# Patient Record
Sex: MALE | Race: WHITE | Employment: OTHER | URBAN - METROPOLITAN AREA
[De-identification: names, ages, dates, MRNs, and addresses within clinical notes are randomized per-mention and may not be internally consistent; named-entity substitution may affect disease eponyms.]

---

## 2017-04-11 NOTE — OP NOTES
PROCEDURE NOTE    Patient: [facility]     DOS: 4/13/2017  Physician: Tyler Tracey MD     SSN:  [id number]      MRN: [id number]  Referring: Dia Lockhart MD    Procedure: Right lumbar medial branch nerve cooled radiofrequency lesion x4    Fluoroscopic guidance    Diagnoses: lumbar spondylosis, M47.816    Surgeon: Tyler Tracey MD    Sedation: IV fentanyl    Location: [address] [place] [address]    Indication: 66 y.o. man with a history of lumbar facet joint DJD and chronic back pain with marked tenderness over the facet joints and positive provocative maneuvers in the history and physical exam.  Prior joint injections and branch nerve blocks were of significant but the expected temporary relief. The patient will have right lumbar medial branch nerve RF lesions today to provide some longer-lasting relief. Description of Procedure: After informed consent was obtained, the patient had standard monitors applied. The patient was placed prone on the procedure table. The right lumbar facet joints were identified under fluoroscopy and the area overlying was prepped with 3 passes of iodine and sterilely draped. Beginning with the L5-S1 level, local anesthetic was injected into the skin and subcutaneous tissues. A 17 gauge, 75 mm RFL introducer needle was inserted and placed under fluoroscopic guidance onto the ala. A second needle was placed after local anesthetic injection on the base of the inferior articular process of the L 4-5 joint. Sensory stimulus at 50 Hz to one volt and motor stimulus at 2 Hz up to 2 volts were negative for sensation or motor activity int he lower extremity. Negative aspiration was followed by easy injection of 2 mL of 1 % plain lidocaine. After 60 seconds, a cooled RF lesion of 60 +/- 2 degrees Centigrade with a 5 mm active tip was applied for a two minute and 45 second cycle without discomfort.   The same procedure was used to perform lesions  At the L 3-4 and L 2-3 levels, all on the right. Negative sensory and motor stimulus was confirmed at each site followed by local anesthetic injection and the same RF intensity and duration with one interruption to inject additional local anesthetic, the remainder of the cycle proceeded without discomfort. A total of four lesions were made, all on the right. There was no significant bleeding from any puncture site. The patient appeared to tolerate the procedure well without complications. The patient was allowed to recover 30 minutes, after which, the patient was alert and oriented times 3 and able to dress and ambulate on their own. The patient was discharged in stable condition. Fluids: None  Estimated Blood Loss: None  Complications: None  Implants: None  Specimens: None  Findings: N/A    Follow-up: RTC in three weeks as previously scheduled. The patient was discharged with instructions to contact us in the interim with any questions or concerns.         Signed By:  Brian Marquez MD           Copy: Files Robby Bass [phone]

## 2017-04-13 ENCOUNTER — HOSPITAL ENCOUNTER (OUTPATIENT)
Age: 72
Setting detail: OUTPATIENT SURGERY
Discharge: HOME | End: 2017-04-13
Attending: PAIN MEDICINE | Admitting: PAIN MEDICINE
Payer: MEDICARE

## 2017-04-13 ENCOUNTER — SURGERY (OUTPATIENT)
Age: 72
End: 2017-04-13

## 2017-04-13 ENCOUNTER — HOSPITAL ENCOUNTER (OUTPATIENT)
Dept: GENERAL RADIOLOGY | Age: 72
Discharge: HOME | End: 2017-04-13
Attending: PAIN MEDICINE
Payer: MEDICARE

## 2017-04-13 VITALS
OXYGEN SATURATION: 93 % | WEIGHT: 189.25 LBS | RESPIRATION RATE: 16 BRPM | BODY MASS INDEX: 29.7 KG/M2 | DIASTOLIC BLOOD PRESSURE: 63 MMHG | SYSTOLIC BLOOD PRESSURE: 130 MMHG | TEMPERATURE: 97.6 F | HEIGHT: 67 IN | HEART RATE: 52 BPM

## 2017-04-13 DIAGNOSIS — R69 DIAGNOSIS UNKNOWN: ICD-10-CM

## 2017-04-13 PROCEDURE — 74011000250 HC RX REV CODE- 250: Performed by: PAIN MEDICINE

## 2017-04-13 PROCEDURE — 76010000138 HC OR TIME 0.5 TO 1 HR: Performed by: PAIN MEDICINE

## 2017-04-13 PROCEDURE — 77030014125 HC TY EPDRL BBMI -B: Performed by: PAIN MEDICINE

## 2017-04-13 PROCEDURE — 77030020508 HC PD GRND GENRTR BAYL -A: Performed by: PAIN MEDICINE

## 2017-04-13 PROCEDURE — 99152 MOD SED SAME PHYS/QHP 5/>YRS: CPT

## 2017-04-13 PROCEDURE — 74011250636 HC RX REV CODE- 250/636: Performed by: PAIN MEDICINE

## 2017-04-13 PROCEDURE — 77030030797 HC PRB ENDOSC SINRGY AVNM -G: Performed by: PAIN MEDICINE

## 2017-04-13 PROCEDURE — 76210000021 HC REC RM PH II 0.5 TO 1 HR: Performed by: PAIN MEDICINE

## 2017-04-13 PROCEDURE — 77030037530: Performed by: PAIN MEDICINE

## 2017-04-13 PROCEDURE — 99153 MOD SED SAME PHYS/QHP EA: CPT

## 2017-04-13 PROCEDURE — 74011250636 HC RX REV CODE- 250/636

## 2017-04-13 RX ORDER — LIDOCAINE HYDROCHLORIDE 10 MG/ML
INJECTION INFILTRATION; PERINEURAL AS NEEDED
Status: DISCONTINUED | OUTPATIENT
Start: 2017-04-13 | End: 2017-04-13 | Stop reason: HOSPADM

## 2017-04-13 RX ORDER — FENTANYL CITRATE 50 UG/ML
INJECTION, SOLUTION INTRAMUSCULAR; INTRAVENOUS AS NEEDED
Status: DISCONTINUED | OUTPATIENT
Start: 2017-04-13 | End: 2017-04-13 | Stop reason: HOSPADM

## 2017-04-13 RX ADMIN — LIDOCAINE HYDROCHLORIDE 11 ML: 10 INJECTION, SOLUTION INFILTRATION; PERINEURAL at 09:30

## 2017-04-13 RX ADMIN — FENTANYL CITRATE 50 MCG: 50 INJECTION, SOLUTION INTRAMUSCULAR; INTRAVENOUS at 09:04

## 2017-04-13 NOTE — IP AVS SNAPSHOT
Earl Mccauley 
 
 
 [address] 
[phone] Patient: Fernando Avendaño MRN: [id number] VMK:0/88/2112 You are allergic to the following Allergen Reactions Motrin (Ibuprofen) Rash Pcn (Penicillins) Swelling Recent Documentation Height Weight BMI Smoking Status 1.702 m 85.8 kg 29.64 kg/m2 Current Every Day Smoker Emergency Contacts Name Discharge Info Relation Home Work Mobile Enedelia Guevara [5] [phone] About your hospitalization You were admitted on:  April 13, 2017 You last received care in the:  [facility] PACU You were discharged on:  April 13, 2017 Unit phone number:  [phone] Why you were hospitalized Your primary diagnosis was:  Not on File Providers Seen During Your Hospitalizations Provider Role Specialty Primary office phone Brandi Sadler MD Attending Provider Anesthesiology [phone] Your Primary Care Physician (PCP) Primary Care Physician Office Phone Office Fax [facility] [phone] Follow-up Information Follow up With Details Comments Contact Info Lilia Pollack, [address] [place] [id number] 
[phone] Your Appointments Friday April 28, 2017 11:10 AM EDT Office Visit Over 3 years with Javy Garay DO [facility] ([facility]) [address] [place] [address] Dasha Nichols  
[phone] Current Discharge Medication List  
  
CONTINUE these medications which have CHANGED Dose & Instructions Dispensing Information Comments Morning Noon Evening Bedtime  
 insulin NPH/insulin regular 100 unit/mL (70-30) injection Commonly known as:  NOVOLIN 70/30, HUMULIN 70/30 What changed:  additional instructions Your last dose was: Your next dose is: by SubCUTAneous route. Resume home dose Quantity:  10 mL Refills:  0 CONTINUE these medications which have NOT CHANGED Dose & Instructions Dispensing Information Comments Morning Noon Evening Bedtime  
 amLODIPine 5 mg tablet Commonly known as:  Deanna Chao Your last dose was: Your next dose is:    
   
   
 Dose:  5 mg Take 5 mg by mouth nightly. Refills:  0  
     
   
   
   
  
 citalopram 40 mg tablet Commonly known as:  Kapil Chavez Your last dose was: Your next dose is:    
   
   
 Dose:  40 mg Take 40 mg by mouth every morning. Indications: ptsd Refills:  0  
     
   
   
   
  
 COMBIVENT RESPIMAT  mcg/actuation inhaler Generic drug:  ipratropium-albuterol Your last dose was: Your next dose is:    
   
   
 Dose:  1 Puff Take 1 Puff by inhalation every six (6) hours as needed for Wheezing. Refills:  0  
     
   
   
   
  
 diazePAM 10 mg tablet Commonly known as:  VALIUM Your last dose was: Your next dose is:    
   
   
 Dose:  10 mg Take 10 mg by mouth every six (6) hours as needed for Anxiety. Indications: not taking Refills:  0  
     
   
   
   
  
 furosemide 40 mg tablet Commonly known as:  LASIX Your last dose was: Your next dose is: Take  by mouth daily as needed. Refills:  0 LIQUITEARS OP Your last dose was: Your next dose is:    
   
   
 apply  to eye as needed. BOTH EYES Refills:  0  
     
   
   
   
  
 lisinopril 10 mg tablet Commonly known as:  Sonali Barrier Your last dose was: Your next dose is:    
   
   
 Dose:  10 mg  
take 10 mg by mouth every morning. Refills:  0  
     
   
   
   
  
 metoprolol tartrate 100 mg IR tablet Commonly known as:  LOPRESSOR Your last dose was:     
   
Your next dose is:    
   
   
 Dose:  100 mg  
 Take 100 mg by mouth two (2) times a day. Refills:  0  
     
   
   
   
  
 oxyCODONE-acetaminophen 7.5-325 mg per tablet Commonly known as:  PERCOCET 7.5 Your last dose was: Your next dose is:    
   
   
 Dose:  1 Tab Take 1 Tab by mouth two (2) times a day. Refills:  0 PLAVIX 75 mg Tab Generic drug:  clopidogrel Your last dose was: Your next dose is:    
   
   
 Dose:  75 mg Take 75 mg by mouth nightly. Refills:  0  
     
   
   
   
  
 pravastatin 40 mg tablet Commonly known as:  PRAVACHOL Your last dose was: Your next dose is:    
   
   
 Dose:  40 mg Take 40 mg by mouth nightly. Refills:  0 PROTONIX 20 mg tablet Generic drug:  pantoprazole Your last dose was: Your next dose is:    
   
   
 Dose:  20 mg Take 20 mg by mouth every morning. Refills:  0  
     
   
   
   
  
 topiramate 100 mg tablet Commonly known as:  TOPAMAX Your last dose was: Your next dose is:    
   
   
 Dose:  100 mg  
take 100 mg by mouth two (2) times a day. Refills:  0  
     
   
   
   
  
 VANICREAM topical cream  
Generic drug:  emollient Your last dose was: Your next dose is:    
   
   
 apply  to affected area as needed. Refills:  0 Discharge Instructions Pain Management Aftercare Common Side Effects that may last 8-12 hours: 
Drowsiness  Slurred speech  Dizziness Poor balance  Blurred vision Hangover effect (headache, upset stomach, etc.) Aftercare Instructions: You must have a responsible adult drive you home. Do not drive a car or operate equipment for at least 12 hours. Do not take any new medications for at least 24 hours unless your doctor has prescribed them and he is aware that you are taking them. Do not drink any alcoholic beverages until the next day. Advance to your normal diet as tolerated. Expect soreness at the injection site that will improve over the next 24 hours. Avoid strenuous exercise or heavy lifting. Pre-injection activities may be resumed tomorrow (unless instructed otherwise by your doctor). MAY USE ICE PACK FOR COMFORT Notify your doctor immediately if any of the following symptoms occur: 
Severe pain at the injection site Bleeding or drainage from injection site Fever 101 degrees F or greater New or increased weakness/numbness of extremities that does not resolve Severe headache that disappears or gets better when you lie down Breathing difficulty Skin rash Vomiting Seizures Unusual drowsiness Doctor's Phone Number: [phone] Follow-up Care: Thursday may 4th at 1330 in office DISCHARGE SUMMARY from Nurse PATIENT INSTRUCTIONS: 
 
After general anesthesia or intravenous sedation, for 24 hours or while taking prescription Narcotics: · Limit your activities · Do not drive and operate hazardous machinery · Do not make important personal or business decisions · Do  not drink alcoholic beverages · If you have not urinated within 8 hours after discharge, please contact your surgeon on call. *  Please give a list of your current medications to your Primary Care Provider. *  Please update this list whenever your medications are discontinued, doses are 
    changed, or new medications (including over-the-counter products) are added. *  Please carry medication information at all times in case of emergency situations. These are general instructions for a healthy lifestyle: No smoking/ No tobacco products/ Avoid exposure to second hand smoke Surgeon General's Warning:  Quitting smoking now greatly reduces serious risk to your health. Obesity, smoking, and sedentary lifestyle greatly increases your risk for illness A healthy diet, regular physical exercise & weight monitoring are important for maintaining a healthy lifestyle You may be retaining fluid if you have a history of heart failure or if you experience any of the following symptoms:  Weight gain of 3 pounds or more overnight or 5 pounds in a week, increased swelling in our hands or feet or shortness of breath while lying flat in bed. Please call your doctor as soon as you notice any of these symptoms; do not wait until your next office visit. Recognize signs and symptoms of STROKE: 
 
F-face looks uneven A-arms unable to move or move unevenly S-speech slurred or non-existent T-time-call 911 as soon as signs and symptoms begin-DO NOT go Back to bed or wait to see if you get better-TIME IS BRAIN. Discharge Orders None Introducing [facility]! Sarah Islas introduces Tyba patient portal. Now you can access parts of your medical record, email your doctor's office, and request medication refills online. 1. In your internet browser, go to [email or web address] 2. Click on the First Time User? Click Here link in the Sign In box. You will see the New Member Sign Up page. 3. Enter your Tyba Access Code exactly as it appears below. You will not need to use this code after youve completed the sign-up process. If you do not sign up before the expiration date, you must request a new code. · Tyba Access Code: [email or web address] Expires: 6/5/2017  3:55 PM 
 
4. Enter the last four digits of your Social Security Number (xxxx) and Date of Birth (mm/dd/yyyy) as indicated and click Submit. You will be taken to the next sign-up page. 5. Create a [employer] ID. This will be your Tyba login ID and cannot be changed, so think of one that is secure and easy to remember. 6. Create a Tyba password. You can change your password at any time. 7. Enter your Password Reset Question and Answer. This can be used at a later time if you forget your password. 8. Enter your e-mail address. You will receive e-mail notification when new information is available in [address] 9. Click Sign Up. You can now view and download portions of your medical record. 10. Click the Download Summary menu link to download a portable copy of your medical information. If you have questions, please visit the Frequently Asked Questions section of the bttn website. Remember, bttn is NOT to be used for urgent needs. For medical emergencies, dial 911. Now available from your iPhone and Android! General Information Please provide this summary of care documentation to your next provider. Patient Signature:  ____________________________________________________________ Date:  ____________________________________________________________  
  
[facility] Provider Signature:  ____________________________________________________________ Date:  ____________________________________________________________

## 2017-04-13 NOTE — H&P
Blood pressure 153/72, pulse 64, temperature 97.8 °F (36.6 °C), resp. rate 18, height 5' 7\" (1.702 m), weight 85.8 kg (189 lb 4 oz), SpO2 95 %. Date of Surgery Update:  Emma Sharma was seen and examined. History and physical has been reviewed. The patient has been examined.  There have been no significant clinical changes since the completion of the originally dated History and Physical.    Signed By: Jose Stuart MD     April 13, 2017 8:48 AM

## 2017-04-13 NOTE — DISCHARGE INSTRUCTIONS
Pain Management Aftercare    Common Side Effects that may last 8-12 hours:  Drowsiness  Slurred speech  Dizziness  Poor balance  Blurred vision     Hangover effect (headache, upset stomach, etc.)    Aftercare Instructions: You must have a responsible adult drive you home. Do not drive a car or operate equipment for at least 12 hours. Do not take any new medications for at least 24 hours unless your doctor has prescribed them and he is aware that you are taking them. Do not drink any alcoholic beverages until the next day. Advance to your normal diet as tolerated. Expect soreness at the injection site that will improve over the next 24 hours. Avoid strenuous exercise or heavy lifting. Pre-injection activities may be resumed tomorrow (unless instructed otherwise by your doctor). MAY USE ICE PACK FOR COMFORT   Notify your doctor immediately if any of the following symptoms occur:  Severe pain at the injection site  Bleeding or drainage from injection site  Fever 101 degrees F or greater  New or increased weakness/numbness of extremities that does not resolve  Severe headache that disappears or gets better when you lie down  Breathing difficulty  Skin rash  Vomiting  Seizures  Unusual drowsiness    Doctor's Phone Number: [phone]    Follow-up Care: Thursday may 4th at 1330 in office      DISCHARGE SUMMARY from Nurse    PATIENT INSTRUCTIONS:    After general anesthesia or intravenous sedation, for 24 hours or while taking prescription Narcotics:  · Limit your activities  · Do not drive and operate hazardous machinery  · Do not make important personal or business decisions  · Do  not drink alcoholic beverages  · If you have not urinated within 8 hours after discharge, please contact your surgeon on call. *  Please give a list of your current medications to your Primary Care Provider.     *  Please update this list whenever your medications are discontinued, doses are      changed, or new medications (including over-the-counter products) are added. *  Please carry medication information at all times in case of emergency situations. These are general instructions for a healthy lifestyle:    No smoking/ No tobacco products/ Avoid exposure to second hand smoke    Surgeon General's Warning:  Quitting smoking now greatly reduces serious risk to your health. Obesity, smoking, and sedentary lifestyle greatly increases your risk for illness    A healthy diet, regular physical exercise & weight monitoring are important for maintaining a healthy lifestyle    You may be retaining fluid if you have a history of heart failure or if you experience any of the following symptoms:  Weight gain of 3 pounds or more overnight or 5 pounds in a week, increased swelling in our hands or feet or shortness of breath while lying flat in bed. Please call your doctor as soon as you notice any of these symptoms; do not wait until your next office visit. Recognize signs and symptoms of STROKE:    F-face looks uneven    A-arms unable to move or move unevenly    S-speech slurred or non-existent    T-time-call 911 as soon as signs and symptoms begin-DO NOT go       Back to bed or wait to see if you get better-TIME IS BRAIN.

## 2018-04-30 ENCOUNTER — HOSPITAL ENCOUNTER (EMERGENCY)
Age: 73
Discharge: HOME | End: 2018-04-30
Attending: EMERGENCY MEDICINE
Payer: MEDICARE

## 2018-04-30 ENCOUNTER — APPOINTMENT (OUTPATIENT)
Dept: GENERAL RADIOLOGY | Age: 73
End: 2018-04-30
Attending: EMERGENCY MEDICINE
Payer: MEDICARE

## 2018-04-30 VITALS
TEMPERATURE: 98 F | HEIGHT: 65 IN | DIASTOLIC BLOOD PRESSURE: 72 MMHG | SYSTOLIC BLOOD PRESSURE: 152 MMHG | BODY MASS INDEX: 30.99 KG/M2 | WEIGHT: 186 LBS | OXYGEN SATURATION: 97 % | HEART RATE: 72 BPM | RESPIRATION RATE: 16 BRPM

## 2018-04-30 DIAGNOSIS — J44.1 ACUTE EXACERBATION OF CHRONIC OBSTRUCTIVE PULMONARY DISEASE (COPD) (HCC): Primary | ICD-10-CM

## 2018-04-30 LAB
ALBUMIN SERPL-MCNC: 3.6 G/DL (ref 3.2–4.6)
ALBUMIN/GLOB SERPL: 1 {RATIO} (ref 1.2–3.5)
ALP SERPL-CCNC: 88 U/L (ref 50–136)
ALT SERPL-CCNC: 21 U/L (ref 12–65)
ANION GAP SERPL CALC-SCNC: 6 MMOL/L (ref 7–16)
AST SERPL-CCNC: 16 U/L (ref 15–37)
ATRIAL RATE: 74 BPM
BASOPHILS # BLD: 0 K/UL (ref 0–0.2)
BASOPHILS NFR BLD: 0 % (ref 0–2)
BILIRUB SERPL-MCNC: 0.3 MG/DL (ref 0.2–1.1)
BNP SERPL-MCNC: 165 PG/ML
BUN SERPL-MCNC: 22 MG/DL (ref 8–23)
CALCIUM SERPL-MCNC: 9.2 MG/DL (ref 8.3–10.4)
CALCULATED P AXIS, ECG09: 46 DEGREES
CALCULATED R AXIS, ECG10: 34 DEGREES
CALCULATED T AXIS, ECG11: 18 DEGREES
CHLORIDE SERPL-SCNC: 105 MMOL/L (ref 98–107)
CO2 SERPL-SCNC: 26 MMOL/L (ref 21–32)
CREAT SERPL-MCNC: 1.28 MG/DL (ref 0.8–1.5)
DIAGNOSIS, 93000: NORMAL
DIFFERENTIAL METHOD BLD: ABNORMAL
EOSINOPHIL # BLD: 0.4 K/UL (ref 0–0.8)
EOSINOPHIL NFR BLD: 5 % (ref 0.5–7.8)
ERYTHROCYTE [DISTWIDTH] IN BLOOD BY AUTOMATED COUNT: 13.6 % (ref 11.9–14.6)
GLOBULIN SER CALC-MCNC: 3.5 G/DL (ref 2.3–3.5)
GLUCOSE SERPL-MCNC: 333 MG/DL (ref 65–100)
HCT VFR BLD AUTO: 40 % (ref 41.1–50.3)
HGB BLD-MCNC: 12.9 G/DL (ref 13.6–17.2)
IMM GRANULOCYTES # BLD: 0 K/UL (ref 0–0.5)
IMM GRANULOCYTES NFR BLD AUTO: 0 % (ref 0–5)
INR PPP: 1
LYMPHOCYTES # BLD: 1.5 K/UL (ref 0.5–4.6)
LYMPHOCYTES NFR BLD: 16 % (ref 13–44)
MCH RBC QN AUTO: 29.9 PG (ref 26.1–32.9)
MCHC RBC AUTO-ENTMCNC: 32.3 G/DL (ref 31.4–35)
MCV RBC AUTO: 92.6 FL (ref 79.6–97.8)
MONOCYTES # BLD: 0.6 K/UL (ref 0.1–1.3)
MONOCYTES NFR BLD: 7 % (ref 4–12)
NEUTS SEG # BLD: 6.7 K/UL (ref 1.7–8.2)
NEUTS SEG NFR BLD: 72 % (ref 43–78)
P-R INTERVAL, ECG05: 172 MS
PLATELET # BLD AUTO: 137 K/UL (ref 150–450)
PMV BLD AUTO: 12.4 FL (ref 10.8–14.1)
POTASSIUM SERPL-SCNC: 4.3 MMOL/L (ref 3.5–5.1)
PROT SERPL-MCNC: 7.1 G/DL (ref 6.3–8.2)
PROTHROMBIN TIME: 12.3 SEC (ref 11.5–14.5)
Q-T INTERVAL, ECG07: 404 MS
QRS DURATION, ECG06: 100 MS
QTC CALCULATION (BEZET), ECG08: 448 MS
RBC # BLD AUTO: 4.32 M/UL (ref 4.23–5.67)
SODIUM SERPL-SCNC: 137 MMOL/L (ref 136–145)
TROPONIN I BLD-MCNC: 0 NG/ML (ref 0.02–0.05)
VENTRICULAR RATE, ECG03: 74 BPM
WBC # BLD AUTO: 9.3 K/UL (ref 4.3–11.1)

## 2018-04-30 PROCEDURE — 85610 PROTHROMBIN TIME: CPT | Performed by: EMERGENCY MEDICINE

## 2018-04-30 PROCEDURE — 99285 EMERGENCY DEPT VISIT HI MDM: CPT | Performed by: EMERGENCY MEDICINE

## 2018-04-30 PROCEDURE — 74011250636 HC RX REV CODE- 250/636: Performed by: EMERGENCY MEDICINE

## 2018-04-30 PROCEDURE — 84484 ASSAY OF TROPONIN QUANT: CPT

## 2018-04-30 PROCEDURE — 96374 THER/PROPH/DIAG INJ IV PUSH: CPT | Performed by: EMERGENCY MEDICINE

## 2018-04-30 PROCEDURE — 85025 COMPLETE CBC W/AUTO DIFF WBC: CPT | Performed by: EMERGENCY MEDICINE

## 2018-04-30 PROCEDURE — 71045 X-RAY EXAM CHEST 1 VIEW: CPT

## 2018-04-30 PROCEDURE — 93005 ELECTROCARDIOGRAM TRACING: CPT | Performed by: EMERGENCY MEDICINE

## 2018-04-30 PROCEDURE — 74011000250 HC RX REV CODE- 250: Performed by: EMERGENCY MEDICINE

## 2018-04-30 PROCEDURE — 83880 ASSAY OF NATRIURETIC PEPTIDE: CPT | Performed by: EMERGENCY MEDICINE

## 2018-04-30 PROCEDURE — 94640 AIRWAY INHALATION TREATMENT: CPT

## 2018-04-30 PROCEDURE — 80053 COMPREHEN METABOLIC PANEL: CPT | Performed by: EMERGENCY MEDICINE

## 2018-04-30 RX ORDER — PREDNISONE 20 MG/1
20 TABLET ORAL 2 TIMES DAILY
Qty: 10 TAB | Refills: 0 | Status: SHIPPED | OUTPATIENT
Start: 2018-04-30 | End: 2018-05-05

## 2018-04-30 RX ORDER — IPRATROPIUM BROMIDE AND ALBUTEROL SULFATE 2.5; .5 MG/3ML; MG/3ML
3 SOLUTION RESPIRATORY (INHALATION)
Status: COMPLETED | OUTPATIENT
Start: 2018-04-30 | End: 2018-04-30

## 2018-04-30 RX ADMIN — METHYLPREDNISOLONE SODIUM SUCCINATE 125 MG: 125 INJECTION, POWDER, FOR SOLUTION INTRAMUSCULAR; INTRAVENOUS at 13:59

## 2018-04-30 RX ADMIN — IPRATROPIUM BROMIDE AND ALBUTEROL SULFATE 3 ML: .5; 3 SOLUTION RESPIRATORY (INHALATION) at 13:50

## 2018-04-30 NOTE — DISCHARGE INSTRUCTIONS
Chronic Obstructive Pulmonary Disease (COPD): Care Instructions  Your Care Instructions    Chronic obstructive pulmonary disease (COPD) is a general term for a group of lung diseases, including emphysema and chronic bronchitis. People with COPD have decreased airflow in and out of the lungs, which makes it hard to breathe. The airways also can get clogged with thick mucus. Cigarette smoking is a major cause of COPD. Although there is no cure for COPD, you can slow its progress. Following your treatment plan and taking care of yourself can help you feel better and live longer. Follow-up care is a key part of your treatment and safety. Be sure to make and go to all appointments, and call your doctor if you are having problems. It's also a good idea to know your test results and keep a list of the medicines you take. How can you care for yourself at home? ?Staying healthy  ? · Do not smoke. This is the most important step you can take to prevent more damage to your lungs. If you need help quitting, talk to your doctor about stop-smoking programs and medicines. These can increase your chances of quitting for good. ? · Avoid colds and flu. Get a pneumococcal vaccine shot. If you have had one before, ask your doctor whether you need a second dose. Get the flu vaccine every fall. If you must be around people with colds or the flu, wash your hands often. ? · Avoid secondhand smoke, air pollution, and high altitudes. Also avoid cold, dry air and hot, humid air. Stay at home with your windows closed when air pollution is bad. ?Medicines and oxygen therapy  ? · Take your medicines exactly as prescribed. Call your doctor if you think you are having a problem with your medicine. ? · You may be taking medicines such as:  ¨ Bronchodilators. These help open your airways and make breathing easier. Bronchodilators are either short-acting (work for 6 to 9 hours) or long-acting (work for 24 hours).  You inhale most bronchodilators, so they start to act quickly. Always carry your quick-relief inhaler with you in case you need it while you are away from home. ¨ Corticosteroids (prednisone, budesonide). These reduce airway inflammation. They come in pill or inhaled form. You must take these medicines every day for them to work well. ? · A spacer may help you get more inhaled medicine to your lungs. Ask your doctor or pharmacist if a spacer is right for you. If it is, ask how to use it properly. ? · Do not take any vitamins, over-the-counter medicine, or herbal products without talking to your doctor first.   ? · If your doctor prescribed antibiotics, take them as directed. Do not stop taking them just because you feel better. You need to take the full course of antibiotics. ? · Oxygen therapy boosts the amount of oxygen in your blood and helps you breathe easier. Use the flow rate your doctor has recommended, and do not change it without talking to your doctor first.   Activity  ? · Get regular exercise. Walking is an easy way to get exercise. Start out slowly, and walk a little more each day. ? · Pay attention to your breathing. You are exercising too hard if you cannot talk while you are exercising. ? · Take short rest breaks when doing household chores and other activities. ? · Learn breathing methods-such as breathing through pursed lips-to help you become less short of breath. ? · If your doctor has not set you up with a pulmonary rehabilitation program, talk to him or her about whether rehab is right for you. Rehab includes exercise programs, education about your disease and how to manage it, help with diet and other changes, and emotional support. Diet  ? · Eat regular, healthy meals. Use bronchodilators about 1 hour before you eat to make it easier to eat. Eat several small meals instead of three large ones. Drink beverages at the end of the meal. Avoid foods that are hard to chew.    ? · Eat foods that contain protein so that you do not lose muscle mass. ? · Talk with your doctor if you gain too much weight or if you lose weight without trying. ?Mental health  ? · Talk to your family, friends, or a therapist about your feelings. It is normal to feel frightened, angry, hopeless, helpless, and even guilty. Talking openly about bad feelings can help you cope. If these feelings last, talk to your doctor. When should you call for help? Call 911 anytime you think you may need emergency care. For example, call if:  ? · You have severe trouble breathing. ?Call your doctor now or seek immediate medical care if:  ? · You have new or worse trouble breathing. ? · You cough up blood. ? · You have a fever. ? Watch closely for changes in your health, and be sure to contact your doctor if:  ? · You cough more deeply or more often, especially if you notice more mucus or a change in the color of your mucus. ? · You have new or worse swelling in your legs or belly. ? · You are not getting better as expected. Where can you learn more? Go to http://nirmal-vicki.info/. Lyla Vega in the search box to learn more about \"Chronic Obstructive Pulmonary Disease (COPD): Care Instructions. \"  Current as of: May 12, 2017  Content Version: 11.4  © 9594-6677 [employer]. Care instructions adapted under license by [employer] (which disclaims liability or warranty for this information). If you have questions about a medical condition or this instruction, always ask your healthcare professional. [address] any warranty or liability for your use of this information.

## 2018-04-30 NOTE — ED NOTES
I have reviewed discharge instructions with the patient. The patient verbalized understanding. Patient left ED via Discharge Method: ambulatory to Home with self  Opportunity for questions and clarification provided. Patient given 1 scripts. To continue your aftercare when you leave the hospital, you may receive an automated call from our care team to check in on how you are doing. This is a free service and part of our promise to provide the best care and service to meet your aftercare needs.  If you have questions, or wish to unsubscribe from this service please call [phone]. Thank you for Choosing our [facility] Emergency Department.

## 2018-04-30 NOTE — ED PROVIDER NOTES
HPI Comments: Patient complains of SOB x 3 to 4 days. Cough productive of clear phlegm. Sinuses draining. Post nasal drip. Nasal congestion. No fever. No chest pain. Feet swollen. States he was supposed to get vascular studies today but was concerned about his breathing so decided to come here. Dr. Joselito Seaman is his cardiologist and recently ordered testing on him. He continues to smoke. Has had CABG and multiple stents. Using combivent inhaler. Patient is a 74 y.o. male presenting with shortness of breath. The history is provided by the patient. Shortness of Breath   This is a recurrent problem. The current episode started more than 2 days ago. The problem has not changed since onset. Associated symptoms include rhinorrhea, cough, sputum production, wheezing and leg swelling. Pertinent negatives include no fever, no headaches, no sore throat, no hemoptysis, no PND, no orthopnea and no chest pain. The problem's precipitants include smoke. He has tried ipratropium inhalers and beta-agonist inhalers for the symptoms. Associated medical issues include COPD and CAD. Past Medical History:   Diagnosis Date    Agent orange exposure     Arthritis     Aspirin long-term use     CAD (coronary artery disease)     cabg, mi x 3 and multiple heart stents    Chronic obstructive pulmonary disease (HCC)     Diabetes ([facility] [address] 1995     insulin reliant/ bs: .hypo in the 40's.      History of prostate cancer 2011    agent Orange    [place] (hard of hearing)     Hypertension     managed with meds    MI, old     x 3    Platelet inhibition due to Plavix     Psychiatric disorder     ptsd    PTSD (post-traumatic stress disorder)     [facility]    S/P CABG x 4 1996    Skin cancer     basal    Smoker     58 yrs 1 pk per day    Stented coronary artery     multiple    syncope        Past Surgical History:   Procedure Laterality Date    CABG, ARTERY-VEIN, FOUR      1996    ELBOW ARTHROSCOP,DIAGNOSTIC Left          HX APPENDECTOMY  1961    HX ORTHOPAEDIC Right     open right shoulder    HX PROSTATECTOMY      HX PTCA      stents heart    HX SHOULDER ARTHROSCOPY Right     HX UROLOGICAL      cysto         Family History:   Problem Relation Age of Onset    Heart Disease Mother     Stroke Mother     Heart Attack Mother     Heart Attack Father     Heart Disease Sister      MI, CABG pt does not remember age   [facility] Heart Attack Sister     Heart Disease Brother     Heart Attack Brother     Diabetes Brother     Heart Disease Brother        Social History     Social History    Marital status: LEGALLY      Spouse name: N/A    Number of children: N/A    Years of education: N/A     Occupational History    Not on file. Social History Main Topics    Smoking status: Current Every Day Smoker     Packs/day: 1.00     Years: 58.00    Smokeless tobacco: Never Used    Alcohol use No    Drug use: No    Sexual activity: Not on file     Other Topics Concern    Not on file     Social History Narrative         ALLERGIES: Motrin [ibuprofen] and Pcn [penicillins]    Review of Systems   Constitutional: Negative for appetite change, chills and fever. HENT: Positive for congestion, postnasal drip, rhinorrhea and sinus pressure. Negative for sore throat. Eyes: Negative. Respiratory: Positive for cough, sputum production, shortness of breath and wheezing. Negative for hemoptysis. Cardiovascular: Positive for leg swelling. Negative for chest pain, palpitations, orthopnea and PND. Gastrointestinal: Negative. Genitourinary: Negative. Musculoskeletal: Negative for arthralgias and myalgias. Skin: Negative. Neurological: Negative. Negative for headaches.        Vitals:    04/30/18 1421 04/30/18 1441 04/30/18 1500 04/30/18 1502   BP: 161/74 170/75  152/72   Pulse: 61 61  72   Resp: 20 20  16   Temp:       SpO2: 92% 93% 96% 97%   Weight:       Height:                Physical Exam   Constitutional: He is oriented to person, place, and time. He appears well-developed and well-nourished. HENT:   Head: Normocephalic and atraumatic. Right Ear: External ear normal.   Left Ear: External ear normal.   Mouth/Throat: Oropharynx is clear and moist.   Eyes: Conjunctivae and EOM are normal. Pupils are equal, round, and reactive to light. No scleral icterus. Neck: Normal range of motion. Neck supple. No JVD present. Cardiovascular: Normal rate, regular rhythm, normal heart sounds and intact distal pulses. Pulmonary/Chest: Effort normal. He has wheezes. Abdominal: Soft. Bowel sounds are normal. He exhibits no mass. There is no tenderness. Musculoskeletal: Normal range of motion. He exhibits edema (lower extremities - mild). He exhibits no tenderness. Neurological: He is alert and oriented to person, place, and time. Skin: Skin is warm and dry. No erythema. Psychiatric: He has a normal mood and affect. His behavior is normal.   Nursing note and vitals reviewed. MDM      ED Course       Procedures    SOB  Acute exacerbation COPD  EKG sinus with no ischemia  CXR clear no infiltrate  Labs reviewed  Duoneb nebulizer treatment  Solumedrol 125 mg IV  Improved  Results discussed with patient  Encouraged to stop smoking. He says he does not intend to and has had a good life.    Rx Prednisone 20 mg bid x 5 days  Follow up with Dr. Lorenza Le and PCP  Return with new or worsening symptoms

## 2018-04-30 NOTE — ED TRIAGE NOTES
Shortness of breath x 4-5 days. No wheezes in triage. Sign. Cardiac history. No chest pain on arrival to ED - pt sts none of his cardiac issues caused pain.

## 2018-08-28 ENCOUNTER — HOSPITAL ENCOUNTER (OUTPATIENT)
Dept: MRI IMAGING | Age: 73
Discharge: HOME | End: 2018-08-28
Attending: ORTHOPAEDIC SURGERY
Payer: MEDICARE

## 2018-08-28 DIAGNOSIS — M54.50 CHRONIC BILATERAL LOW BACK PAIN WITHOUT SCIATICA: ICD-10-CM

## 2018-08-28 DIAGNOSIS — G89.29 CHRONIC BILATERAL LOW BACK PAIN WITHOUT SCIATICA: ICD-10-CM

## 2018-08-28 PROCEDURE — 72148 MRI LUMBAR SPINE W/O DYE: CPT

## 2018-09-24 NOTE — IP AVS SNAPSHOT
Current Discharge Medication List  
  
CONTINUE these medications which have CHANGED Dose & Instructions Dispensing Information Comments Morning Noon Evening Bedtime  
 insulin NPH/insulin regular 100 unit/mL (70-30) injection Commonly known as:  NOVOLIN 70/30, HUMULIN 70/30 What changed:  additional instructions Your last dose was: Your next dose is:    
   
   
 by SubCUTAneous route. Resume home dose Quantity:  10 mL Refills:  0 CONTINUE these medications which have NOT CHANGED Dose & Instructions Dispensing Information Comments Morning Noon Evening Bedtime  
 amLODIPine 5 mg tablet Commonly known as:  Peterson Carmen Your last dose was: Your next dose is:    
   
   
 Dose:  5 mg Take 5 mg by mouth nightly. Refills:  0  
     
   
   
   
  
 citalopram 40 mg tablet Commonly known as:  Meagan Bazan Your last dose was: Your next dose is:    
   
   
 Dose:  40 mg Take 40 mg by mouth every morning. Indications: ptsd Refills:  0  
     
   
   
   
  
 COMBIVENT RESPIMAT  mcg/actuation inhaler Generic drug:  ipratropium-albuterol Your last dose was: Your next dose is:    
   
   
 Dose:  1 Puff Take 1 Puff by inhalation every six (6) hours as needed for Wheezing. Refills:  0  
     
   
   
   
  
 diazePAM 10 mg tablet Commonly known as:  VALIUM Your last dose was: Your next dose is:    
   
   
 Dose:  10 mg Take 10 mg by mouth every six (6) hours as needed for Anxiety. Indications: not taking Refills:  0  
     
   
   
   
  
 furosemide 40 mg tablet Commonly known as:  LASIX Your last dose was: Your next dose is: Take  by mouth daily as needed. Refills:  0 LIQUITEARS OP Your last dose was: Your next dose is:    
   
   
 apply  to eye as needed. BOTH EYES Refills:  0 lisinopril 10 mg tablet Commonly known as:  Zaynab Gear Your last dose was: Your next dose is:    
   
   
 Dose:  10 mg  
take 10 mg by mouth every morning. Refills:  0  
     
   
   
   
  
 metoprolol tartrate 100 mg IR tablet Commonly known as:  LOPRESSOR Your last dose was: Your next dose is:    
   
   
 Dose:  100 mg Take 100 mg by mouth two (2) times a day. Refills:  0  
     
   
   
   
  
 oxyCODONE-acetaminophen 7.5-325 mg per tablet Commonly known as:  PERCOCET 7.5 Your last dose was: Your next dose is:    
   
   
 Dose:  1 Tab Take 1 Tab by mouth two (2) times a day. Refills:  0 PLAVIX 75 mg Tab Generic drug:  clopidogrel Your last dose was: Your next dose is:    
   
   
 Dose:  75 mg Take 75 mg by mouth nightly. Refills:  0  
     
   
   
   
  
 pravastatin 40 mg tablet Commonly known as:  PRAVACHOL Your last dose was: Your next dose is:    
   
   
 Dose:  40 mg Take 40 mg by mouth nightly. Refills:  0 PROTONIX 20 mg tablet Generic drug:  pantoprazole Your last dose was: Your next dose is:    
   
   
 Dose:  20 mg Take 20 mg by mouth every morning. Refills:  0  
     
   
   
   
  
 topiramate 100 mg tablet Commonly known as:  TOPAMAX Your last dose was: Your next dose is:    
   
   
 Dose:  100 mg  
take 100 mg by mouth two (2) times a day. Refills:  0  
     
   
   
   
  
 VANICREAM topical cream  
Generic drug:  emollient Your last dose was: Your next dose is:    
   
   
 apply  to affected area as needed. Refills:  0 Previous Accession (Optional): [id number] Previous Accession (Optional): [id number]

## 2019-02-03 ENCOUNTER — ANESTHESIA EVENT (OUTPATIENT)
Dept: ENDOSCOPY | Age: 74
End: 2019-02-03
Payer: MEDICARE

## 2019-02-03 RX ORDER — SODIUM CHLORIDE, SODIUM LACTATE, POTASSIUM CHLORIDE, CALCIUM CHLORIDE 600; 310; 30; 20 MG/100ML; MG/100ML; MG/100ML; MG/100ML
100 INJECTION, SOLUTION INTRAVENOUS CONTINUOUS
Status: CANCELLED | OUTPATIENT
Start: 2019-02-03

## 2019-02-04 ENCOUNTER — HOSPITAL ENCOUNTER (OUTPATIENT)
Age: 74
Setting detail: OUTPATIENT SURGERY
Discharge: HOME | End: 2019-02-04
Attending: INTERNAL MEDICINE | Admitting: INTERNAL MEDICINE
Payer: MEDICARE

## 2019-02-04 ENCOUNTER — ANESTHESIA (OUTPATIENT)
Dept: ENDOSCOPY | Age: 74
End: 2019-02-04
Payer: MEDICARE

## 2019-02-04 VITALS
TEMPERATURE: 97.7 F | OXYGEN SATURATION: 93 % | DIASTOLIC BLOOD PRESSURE: 60 MMHG | RESPIRATION RATE: 14 BRPM | SYSTOLIC BLOOD PRESSURE: 117 MMHG | BODY MASS INDEX: 26.84 KG/M2 | WEIGHT: 167 LBS | HEART RATE: 66 BPM | HEIGHT: 66 IN

## 2019-02-04 LAB — GLUCOSE BLD STRIP.AUTO-MCNC: 158 MG/DL (ref 65–100)

## 2019-02-04 PROCEDURE — 88305 TISSUE EXAM BY PATHOLOGIST: CPT

## 2019-02-04 PROCEDURE — 77030009426 HC FCPS BIOP ENDOSC BSC -B: Performed by: INTERNAL MEDICINE

## 2019-02-04 PROCEDURE — 76040000025: Performed by: INTERNAL MEDICINE

## 2019-02-04 PROCEDURE — 88312 SPECIAL STAINS GROUP 1: CPT

## 2019-02-04 PROCEDURE — 82962 GLUCOSE BLOOD TEST: CPT

## 2019-02-04 PROCEDURE — C1726 CATH, BAL DIL, NON-VASCULAR: HCPCS | Performed by: INTERNAL MEDICINE

## 2019-02-04 PROCEDURE — 74011250636 HC RX REV CODE- 250/636

## 2019-02-04 PROCEDURE — 74011250636 HC RX REV CODE- 250/636: Performed by: ANESTHESIOLOGY

## 2019-02-04 PROCEDURE — 76060000032 HC ANESTHESIA 0.5 TO 1 HR: Performed by: INTERNAL MEDICINE

## 2019-02-04 RX ORDER — ASPIRIN 81 MG/1
325 TABLET ORAL DAILY
COMMUNITY

## 2019-02-04 RX ORDER — METFORMIN HYDROCHLORIDE 1000 MG/1
1000 TABLET ORAL 2 TIMES DAILY WITH MEALS
COMMUNITY

## 2019-02-04 RX ORDER — SODIUM CHLORIDE, SODIUM LACTATE, POTASSIUM CHLORIDE, CALCIUM CHLORIDE 600; 310; 30; 20 MG/100ML; MG/100ML; MG/100ML; MG/100ML
100 INJECTION, SOLUTION INTRAVENOUS CONTINUOUS
Status: DISCONTINUED | OUTPATIENT
Start: 2019-02-04 | End: 2019-02-04 | Stop reason: HOSPADM

## 2019-02-04 RX ORDER — INSULIN GLARGINE 100 [IU]/ML
8 INJECTION, SOLUTION SUBCUTANEOUS DAILY
COMMUNITY

## 2019-02-04 RX ORDER — PROPOFOL 10 MG/ML
INJECTION, EMULSION INTRAVENOUS
Status: DISCONTINUED | OUTPATIENT
Start: 2019-02-04 | End: 2019-02-04 | Stop reason: HOSPADM

## 2019-02-04 RX ORDER — LIDOCAINE HYDROCHLORIDE 20 MG/ML
INJECTION, SOLUTION EPIDURAL; INFILTRATION; INTRACAUDAL; PERINEURAL AS NEEDED
Status: DISCONTINUED | OUTPATIENT
Start: 2019-02-04 | End: 2019-02-04 | Stop reason: HOSPADM

## 2019-02-04 RX ORDER — ATORVASTATIN CALCIUM 20 MG/1
20 TABLET, FILM COATED ORAL
COMMUNITY

## 2019-02-04 RX ORDER — PROPOFOL 10 MG/ML
INJECTION, EMULSION INTRAVENOUS AS NEEDED
Status: DISCONTINUED | OUTPATIENT
Start: 2019-02-04 | End: 2019-02-04 | Stop reason: HOSPADM

## 2019-02-04 RX ADMIN — PROPOFOL 40 MG: 10 INJECTION, EMULSION INTRAVENOUS at 11:51

## 2019-02-04 RX ADMIN — PROPOFOL 120 MCG/KG/MIN: 10 INJECTION, EMULSION INTRAVENOUS at 11:51

## 2019-02-04 RX ADMIN — SODIUM CHLORIDE, SODIUM LACTATE, POTASSIUM CHLORIDE, AND CALCIUM CHLORIDE: 600; 310; 30; 20 INJECTION, SOLUTION INTRAVENOUS at 11:43

## 2019-02-04 RX ADMIN — LIDOCAINE HYDROCHLORIDE 40 MG: 20 INJECTION, SOLUTION EPIDURAL; INFILTRATION; INTRACAUDAL; PERINEURAL at 11:51

## 2019-02-04 NOTE — H&P
[facility] Consult Note           Referring Physician:     Consult Date: 2/4/2019    Reason for Consult: Dysphagia and nausea    History of Present Illness:  Patient is a 74 y.o. male who is seen in consultation for Dysphagia and nausea. Past Medical History:   Diagnosis Date    Agent orange exposure     Arthritis     Aspirin long-term use     CAD (coronary artery disease)     cabg, mi x 3 and multiple heart stents    Chronic obstructive pulmonary disease (HCC)     Diabetes ([address] 1995     insulin reliant/ bs: .hypo in the 40's.      History of prostate cancer 2011    agent Orange    [place]hard [place]     Hypertension     managed with meds    MI, old     x 3    Platelet inhibition due to Plavix     Psychiatric disorder     ptsd    PTSD (post-traumatic stress disorder)     [place] Vet    S/P CABG x 4 1996    Skin cancer     basal    Smoker     60 yrs 1 pk per day    Stented coronary artery     multiple    syncope       Past Surgical History:   Procedure Laterality Date    CABG, ARTERY-VEIN, FOUR      1996    ELBOW ARTHROSCOP,DIAGNOSTIC Left          HX APPENDECTOMY  1961    HX ORTHOPAEDIC Right     open right shoulder    HX PROSTATECTOMY      HX PTCA      stents heart    HX SHOULDER ARTHROSCOPY Right     HX UROLOGICAL      cysto      Family History   Problem Relation Age of Onset    Heart Disease Mother     Stroke Mother     Heart Attack Mother     Heart Attack Father     Heart Disease Sister         MI, CABG pt does not remember age   [facility] Heart Attack Sister     Heart Disease Brother     Heart Attack Brother     Diabetes Brother     Heart Disease Brother      Social History     Occupational History    Not on file   Tobacco Use    Smoking status: Current Every Day Smoker     Packs/day: 1.00     Years: 58.00     Pack years: 58.00    Smokeless tobacco: Never Used   Substance and Sexual Activity    Alcohol use: No    Drug use: No    Sexual activity: Not on file       Hospital Medications:  No current facility-administered medications for this encounter. Allergies: Allergies   Allergen Reactions    Motrin [Ibuprofen] Rash    Pcn [Penicillins] Swelling       Review of Systems:  A comprehensive review of systems was negative except for that written in the History of Present Illness. Objective:     Physical Exam:  Vitals:  Visit Vitals  /58   Pulse 67   Temp 97.9 °F (36.6 °C)   Resp 18   Ht 5' 6\" (1.676 m)   Wt 75.8 kg (167 lb)   SpO2 97%   BMI 26.95 kg/m²       General: No acute distress. Skin:  Extremities and face reveal no rashes. No maynard erythema. No telangiectasias on the chest wall. HEENT: Sclerae anicteric. No oral ulcers. No abnormal pigmentation of the lips. The neck is supple. Cardiovascular: Regular rate and rhythm. No murmurs, gallops, or rubs. Respiratory:  Comfortable breathing  With no accessory muscle use. Clear breath sounds with no wheezes, rales, or rhonchi. GI:  Abdomen nondistended, soft, and nontender. Normal active bowel sounds. No enlargement of the liver or spleen. No masses palpable. Musculoskeletal:  No pitting edema of the lower legs. Extremities have good range of motion. Neurological:  Gross memory appears intact. Patient is alert and oriented. Psychiatric:  Mood appears appropriate with judgement intact. Lymphatic:  No cervical or supraclavicular adenopathy. Laboratory:    No results for input(s): WBC, RBC, HGB, HCT, PLT, HGBEXT, HCTEXT, PLTEXT in the last 72 hours. No results for input(s): GLU, NA, K, CL, CO2, BUN, CREA, CA in the last 72 hours. No results for input(s): PTP, INR, APTT in the last 72 hours. No lab exists for component: INREXT  No results for input(s): TBIL, CBIL, SGOT, GPT, AP, ALB, TP, AML, LPSE in the last 72 hours.     Assessment:       A 74 y.o. male with Dysphagia and nausea      Plan:       EGD    Signed By: Dusty Vigil MD     February 4, 2019

## 2019-02-04 NOTE — PROCEDURES
Endoscopic Gastroduodenoscopy Procedure Note    Indications: dysphagia    Anesthesia/Sedation: MAC IV     Pre-Procedure Physical:    Current Facility-Administered Medications   Medication Dose Route Frequency    lactated Ringers infusion  100 mL/hr IntraVENous CONTINUOUS      Motrin [ibuprofen] and Pcn [penicillins]    Patient Vitals for the past 8 hrs:   BP Temp Pulse Resp SpO2 Height Weight   02/04/19 1147 118/59 -- 63 20 98 % -- --   02/04/19 1102 123/58 -- 67 18 97 % -- --   02/04/19 1037 -- 97.9 °F (36.6 °C) -- -- -- 5' 6\" (1.676 m) 75.8 kg (167 lb)       Exam      Airway: clear   Heart: normal S1and S2    Lungs: clear bilateral  Abdomen: soft, nontender, bowel sounds present and normal in all quads   Mental Status: awake, alert and oriented to person, place and time          Procedure Details     Informed consent was obtained for the procedure, including conscious sedation. Risks of pancreatitis, infection, perforation, hemorrhage, adverse drug reaction and aspiration were discussed. The patient was placed in the left lateral decubitus position. Based on the pre-procedure assessment, including review of the patient's medical history, medications, allergies, and review of systems, he had been deemed to be an appropriate candidate for conscious sedation; he was therefore sedated with the medications listed below. He was monitored continuously with ECG tracing, pulse oximetry, blood pressure monitoring, and direct observation. The EGD gastroscope was inserted into the mouth and advanced under direct vision to the second portion of the duodenum. A careful inspection was made as the gastroscope was withdrawn, including a retroflexed view of the proximal stomach; findings and interventions are described below. Appropriate photodocumentation was obtained. Findings:   Esophagus- Schatzki ring. Dilated with 15-18 mm balloon. Also mild erosive esophagitis.   Stomach- Superficial clean-based pyloric channel gastric ulcer. Random gastric biopsies taken. Duodenum- Normal.    Therapies: Dilation, biopsy    Specimens: Gastric    Estimated Blood Loss: 0 cc           Complications:   None; patient tolerated the procedure well. Attending Attestation:  I performed the procedure. Impression:    Schatzki ring. Dilated with 15-18 mm balloon. Also mild erosive esophagitis. Superficial clean-based pyloric channel gastric ulcer. Biopsied.     Recommendations:  Await path results  Increase Protonix to 40 bid  Repeat EGD in 6-8 wks to check healing of gastric ulcer  Repeat dilation prn

## 2019-02-04 NOTE — PROGRESS NOTES
Pt. Discharged to car by Amada Ash with family. Vital signs stable. Able to tolerate PO fluids. Passing gas.  Seen by MD.

## 2019-02-04 NOTE — DISCHARGE INSTRUCTIONS
Gastrointestinal Esophagogastroduodenoscopy (EGD) - Upper Exam Discharge Instructions    1. Call Dr. Ori Lopez at [phone] for any problems or questions. 2. Contact the doctor's office for follow up appointment as directed. 3. Medication may cause drowsiness for several hours, therefore, do not drive or operate machinery  4. No alcohol today. 5. Ordinarily, you may resume regular diet and activity after exam unless otherwise  specified by your physician. 6. For mild soreness in your throat you may use Cepacol throat lozenges or warm   salt-water gargles as needed. Any additional instructions:  Repeat EGD in 6-8 weeks     Instructions given to Zarco's and other family members.   Instructions given by:

## 2019-02-04 NOTE — ANESTHESIA POSTPROCEDURE EVALUATION
Procedure(s): ESOPHAGOGASTRODUODENOSCOPY (EGD)  BMI 28 ESOPHAGOGASTRODUODENAL (EGD) BIOPSY 
ESOPHAGEAL DILATION. Anesthesia Post Evaluation Patient location during evaluation: PACU Patient participation: complete - patient participated Level of consciousness: awake Pain management: adequate Airway patency: patent Anesthetic complications: no 
Cardiovascular status: acceptable Respiratory status: spontaneous ventilation and acceptable Hydration status: acceptable Visit Vitals /76 Pulse 63 Temp 36.5 °C (97.7 °F) Resp 16 Ht 5' 6\" (1.676 m) Wt 75.8 kg (167 lb) SpO2 97% BMI 26.95 kg/m²

## 2019-02-04 NOTE — ANESTHESIA PREPROCEDURE EVALUATION
Anesthetic History No history of anesthetic complications Review of Systems / Medical History Patient summary reviewed and pertinent labs reviewed Pulmonary COPD: moderate Smoker Neuro/Psych Psychiatric history (PTSD) Cardiovascular Hypertension: well controlled Past MI, CAD, cardiac stents (Multiple) and CABG (1996) Exercise tolerance: >4 METS Comments: Denies recent CP, SOB or Palpitations Echo 5/2018 showing EF 55-65% with a mildly dilated aortic root and ascending aorta, mild TR.  
GI/Hepatic/Renal 
Within defined limits Endo/Other Diabetes: type 2 Arthritis Other Findings Physical Exam 
 
Airway Mallampati: II 
TM Distance: 4 - 6 cm Neck ROM: normal range of motion Mouth opening: Normal 
 
 Cardiovascular Regular rate and rhythm,  S1 and S2 normal,  no murmur, click, rub, or gallop Dental 
 
Dentition: Full upper dentures Pulmonary Breath sounds clear to auscultation Abdominal 
GI exam deferred Other Findings Anesthetic Plan ASA: 3 Anesthesia type: total IV anesthesia Induction: Intravenous Anesthetic plan and risks discussed with: Patient Took BP Meds and ASA 81mg this AM. Off Plavix for 5 days.

## 2019-03-26 ENCOUNTER — HOSPITAL ENCOUNTER (OUTPATIENT)
Age: 74
Setting detail: OUTPATIENT SURGERY
Discharge: HOME | End: 2019-03-26
Attending: INTERNAL MEDICINE | Admitting: INTERNAL MEDICINE
Payer: MEDICARE

## 2019-03-26 ENCOUNTER — ANESTHESIA EVENT (OUTPATIENT)
Dept: ENDOSCOPY | Age: 74
End: 2019-03-26
Payer: MEDICARE

## 2019-03-26 ENCOUNTER — ANESTHESIA (OUTPATIENT)
Dept: ENDOSCOPY | Age: 74
End: 2019-03-26
Payer: MEDICARE

## 2019-03-26 VITALS
SYSTOLIC BLOOD PRESSURE: 171 MMHG | TEMPERATURE: 98 F | HEIGHT: 66 IN | BODY MASS INDEX: 26.52 KG/M2 | HEART RATE: 62 BPM | WEIGHT: 165 LBS | OXYGEN SATURATION: 92 % | RESPIRATION RATE: 14 BRPM | DIASTOLIC BLOOD PRESSURE: 71 MMHG

## 2019-03-26 LAB — GLUCOSE BLD STRIP.AUTO-MCNC: 175 MG/DL (ref 65–100)

## 2019-03-26 PROCEDURE — 76040000025: Performed by: INTERNAL MEDICINE

## 2019-03-26 PROCEDURE — 74011250636 HC RX REV CODE- 250/636: Performed by: ANESTHESIOLOGY

## 2019-03-26 PROCEDURE — 76060000032 HC ANESTHESIA 0.5 TO 1 HR: Performed by: INTERNAL MEDICINE

## 2019-03-26 PROCEDURE — 74011250636 HC RX REV CODE- 250/636

## 2019-03-26 PROCEDURE — 82962 GLUCOSE BLOOD TEST: CPT

## 2019-03-26 RX ORDER — PROPOFOL 10 MG/ML
INJECTION, EMULSION INTRAVENOUS AS NEEDED
Status: DISCONTINUED | OUTPATIENT
Start: 2019-03-26 | End: 2019-03-26 | Stop reason: HOSPADM

## 2019-03-26 RX ORDER — SODIUM CHLORIDE 0.9 % (FLUSH) 0.9 %
5-40 SYRINGE (ML) INJECTION EVERY 8 HOURS
Status: CANCELLED | OUTPATIENT
Start: 2019-03-26

## 2019-03-26 RX ORDER — SODIUM CHLORIDE, SODIUM LACTATE, POTASSIUM CHLORIDE, CALCIUM CHLORIDE 600; 310; 30; 20 MG/100ML; MG/100ML; MG/100ML; MG/100ML
100 INJECTION, SOLUTION INTRAVENOUS CONTINUOUS
Status: CANCELLED | OUTPATIENT
Start: 2019-03-26

## 2019-03-26 RX ORDER — PROPOFOL 10 MG/ML
INJECTION, EMULSION INTRAVENOUS
Status: DISCONTINUED | OUTPATIENT
Start: 2019-03-26 | End: 2019-03-26 | Stop reason: HOSPADM

## 2019-03-26 RX ORDER — SODIUM CHLORIDE, SODIUM LACTATE, POTASSIUM CHLORIDE, CALCIUM CHLORIDE 600; 310; 30; 20 MG/100ML; MG/100ML; MG/100ML; MG/100ML
100 INJECTION, SOLUTION INTRAVENOUS CONTINUOUS
Status: DISCONTINUED | OUTPATIENT
Start: 2019-03-26 | End: 2019-03-26 | Stop reason: HOSPADM

## 2019-03-26 RX ORDER — LIDOCAINE HYDROCHLORIDE 20 MG/ML
INJECTION, SOLUTION EPIDURAL; INFILTRATION; INTRACAUDAL; PERINEURAL AS NEEDED
Status: DISCONTINUED | OUTPATIENT
Start: 2019-03-26 | End: 2019-03-26 | Stop reason: HOSPADM

## 2019-03-26 RX ORDER — SODIUM CHLORIDE 0.9 % (FLUSH) 0.9 %
5-40 SYRINGE (ML) INJECTION AS NEEDED
Status: CANCELLED | OUTPATIENT
Start: 2019-03-26

## 2019-03-26 RX ADMIN — PROPOFOL 100 MCG/KG/MIN: 10 INJECTION, EMULSION INTRAVENOUS at 10:29

## 2019-03-26 RX ADMIN — LIDOCAINE HYDROCHLORIDE 30 MG: 20 INJECTION, SOLUTION EPIDURAL; INFILTRATION; INTRACAUDAL; PERINEURAL at 10:29

## 2019-03-26 RX ADMIN — PROPOFOL 40 MG: 10 INJECTION, EMULSION INTRAVENOUS at 10:29

## 2019-03-26 RX ADMIN — SODIUM CHLORIDE, SODIUM LACTATE, POTASSIUM CHLORIDE, AND CALCIUM CHLORIDE 100 ML/HR: 600; 310; 30; 20 INJECTION, SOLUTION INTRAVENOUS at 09:41

## 2019-03-26 NOTE — PROCEDURES
Colonoscopy Procedure Note    Indications: CRC screening    Anesthesia/Sedation: MAC IV     Pre-Procedure Physical:  Current Facility-Administered Medications   Medication Dose Route Frequency    lactated Ringers infusion  100 mL/hr IntraVENous CONTINUOUS     Facility-Administered Medications Ordered in Other Encounters   Medication Dose Route Frequency    lidocaine (PF) (XYLOCAINE) 20 mg/mL (2 %) injection   IntraVENous PRN    propofol (DIPRIVAN) 10 mg/mL injection    PRN    propofol (DIPRIVAN) 10 mg/mL injection    CONTINUOUS      Motrin [ibuprofen] and Pcn [penicillins]    Patient Vitals for the past 8 hrs:   BP Temp Pulse Resp SpO2 Height Weight   03/26/19 1020 173/71 -- 66 18 95 % -- --   03/26/19 0925 155/73 -- 87 18 99 % -- --   03/26/19 0906 -- 98.5 °F (36.9 °C) -- -- -- 5' 6\" (1.676 m) 74.8 kg (165 lb)       Exam:    Airway: clear   Heart: normal S1and S2    Lungs: clear bilateral  Abdomen: soft, nontender, bowel sounds present and normal in all quads   Mental Status: awake, alert and oriented to person, place and time        Procedure Details      Informed consent was obtained for the procedure, including sedation. Risks of perforation, hemorrhage, adverse drug reaction and aspiration were discussed. The patient was placed in the left lateral decubitus position. Based on the pre-procedure assessment, including review of the patient's medical history, medications, allergies, and review of systems, he had been deemed to be an appropriate candidate for conscious sedation; he was therefore sedated with the medications listed below. The patient was monitored continuously with ECG tracing, pulse oximetry, blood pressure monitoring, and direct observations. A rectal examination was performed. The colonoscope was inserted into the rectum and advanced under direct vision to the cecum. The quality of the colonic preparation was good.  A careful inspection was made as the colonoscope was withdrawn, including a retroflexed view of the rectum; findings and interventions are described below. Appropriate photodocumentation was obtained. Findings:   Normal    Specimens: None    Estimated Blood Loss: 0 cc           Complications: None; patient tolerated the procedure well. Attending Attestation: I performed the procedure. Impression:    Normal colon. Recommendations:   No need for further routine screening colonoscopy.

## 2019-03-26 NOTE — DISCHARGE INSTRUCTIONS
Gastrointestinal Esophagogastroduodenoscopy (EGD) - Upper Exam Discharge Instructions    1. For mild soreness in your throat you may use Cepacol throat lozenges or warm salt-water gargles as needed. Any additional instructions: repeat dilation as needed; decrease Protonix to once daily        Gastrointestinal Colonoscopy/Flexible Sigmoidoscopy - Lower Exam Discharge Instructions  1. Call Dr. Thiago Hoffman at [phone] for any problems or questions. 2. Contact the doctors office for follow up appointment as directed  3. Medication may cause drowsiness for several hours, therefore, do not drive or operate machinery for remainder of the day. Do not make any legal decisions today. 4. No alcohol today. 5. Ordinarily, you may resume regular diet and activity after exam unless otherwise specified by your physician. 6. Because of air put into your colon during exam, you may experience some abdominal distension, relieved by the passage of gas, for several hours. 7. Contact your physician if you have any of the following:  a. Excessive amount of bleeding - large amount when having a bowel movement. Occasional specks of blood with bowel movement would not be unusual.  b. Severe abdominal pain  c.  Fever or Chills  Any additional instructions: no further colonoscopies needed due to age

## 2019-03-26 NOTE — ROUTINE PROCESS
VSS. No complaints noted. Education given and reviewed with caregiver who voiced understanding. Pt wheeled out via wheelchair by Jono Howard.

## 2019-03-26 NOTE — PROCEDURES
Endoscopic Gastroduodenoscopy Procedure Note    Indications: Dysphagia, PMH gastric ulcer    Anesthesia/Sedation: MAC IV     Pre-Procedure Physical:    Current Facility-Administered Medications   Medication Dose Route Frequency    lactated Ringers infusion  100 mL/hr IntraVENous CONTINUOUS     Facility-Administered Medications Ordered in Other Encounters   Medication Dose Route Frequency    lidocaine (PF) (XYLOCAINE) 20 mg/mL (2 %) injection   IntraVENous PRN    propofol (DIPRIVAN) 10 mg/mL injection    PRN    propofol (DIPRIVAN) 10 mg/mL injection    CONTINUOUS      Motrin [ibuprofen] and Pcn [penicillins]    Patient Vitals for the past 8 hrs:   BP Temp Pulse Resp SpO2 Height Weight   03/26/19 1020 173/71 -- 66 18 95 % -- --   03/26/19 0925 155/73 -- 87 18 99 % -- --   03/26/19 0906 -- 98.5 °F (36.9 °C) -- -- -- 5' 6\" (1.676 m) 74.8 kg (165 lb)       Exam      Airway: clear   Heart: normal S1and S2    Lungs: clear bilateral  Abdomen: soft, nontender, bowel sounds present and normal in all quads   Mental Status: awake, alert and oriented to person, place and time          Procedure Details     Informed consent was obtained for the procedure, including conscious sedation. Risks of pancreatitis, infection, perforation, hemorrhage, adverse drug reaction and aspiration were discussed. The patient was placed in the left lateral decubitus position. Based on the pre-procedure assessment, including review of the patient's medical history, medications, allergies, and review of systems, he had been deemed to be an appropriate candidate for conscious sedation; he was therefore sedated with the medications listed below. He was monitored continuously with ECG tracing, pulse oximetry, blood pressure monitoring, and direct observation. The EGD gastroscope was inserted into the mouth and advanced under direct vision to the second portion of the duodenum.   A careful inspection was made as the gastroscope was withdrawn, including a retroflexed view of the proximal stomach; findings and interventions are described below. Appropriate photodocumentation was obtained. Findings:   Esophagus- Mild Schatzki ring. Dilated with 48, 51, 54 Fr Savaries. Mild heme resulted. Stomach- Previously described gastric ulcer has totally resolved. Duodenum- Normal.    Therapies: Dilation    Specimens: None    Estimated Blood Loss: 0 cc           Complications:   None; patient tolerated the procedure well. Attending Attestation:  I performed the procedure. Impression:    Mild Schatzki ring. Dilated with 48, 51, 54 Fr Savaries. Previously described gastric ulcer has totally resolved. Recommendations:  Can decrease Protonix from bid to every day. Repeat dilation prn.

## 2019-03-26 NOTE — ANESTHESIA PREPROCEDURE EVALUATION
Anesthetic History No history of anesthetic complications Review of Systems / Medical History Patient summary reviewed and pertinent labs reviewed Pulmonary COPD: moderate Smoker Neuro/Psych Psychiatric history (PTSD) Cardiovascular Hypertension: well controlled Past MI, CAD, cardiac stents (Multiple) and CABG (1996) Exercise tolerance: >4 METS Comments: Denies recent CP, SOB or Palpitations Echo 5/2018 showing EF 55-65% with a mildly dilated aortic root and ascending aorta, mild TR.  
GI/Hepatic/Renal 
Within defined limits Endo/Other Diabetes: type 2 Arthritis Other Findings Physical Exam 
 
Airway Mallampati: II 
TM Distance: 4 - 6 cm Neck ROM: normal range of motion Mouth opening: Normal 
 
 Cardiovascular Regular rate and rhythm,  S1 and S2 normal,  no murmur, click, rub, or gallop Dental 
 
Dentition: Full upper dentures Pulmonary Breath sounds clear to auscultation Abdominal 
GI exam deferred Other Findings Anesthetic Plan ASA: 3 Anesthesia type: total IV anesthesia Induction: Intravenous Anesthetic plan and risks discussed with: Patient Took BP Meds and ASA 81mg this AM. Off Plavix for 5 days.
(4) no limitation

## 2019-03-26 NOTE — H&P
[facility] Consult Note           Referring Physician:     Consult Date: 3/26/2019    Reason for Consult: Dysphagia, PMH gastric ulcer, CRC screening    History of Present Illness:  Patient is a 74 y.o. male who is seen in consultation for Dysphagia, PMH gastric ulcer, CRC screening. Past Medical History:   Diagnosis Date    Agent orange exposure     Arthritis     Aspirin long-term use     CAD (coronary artery disease)     cabg, mi x 3 and multiple heart stents    Chronic obstructive pulmonary disease (HCC)     Diabetes ([address] 1995     insulin reliant/ bs: .hypo in the 40's.      History of prostate cancer 2011    agent Orange    [place] (hard of hearing)     Hypertension     managed with meds    MI, old     x 3    Platelet inhibition due to Plavix     Psychiatric disorder     ptsd    PTSD (post-traumatic stress disorder)     [place]    S/P CABG x 4 1996    Skin cancer     basal    Smoker     58 yrs 1 pk per day    Stented coronary artery     multiple    syncope       Past Surgical History:   Procedure Laterality Date    CABG, ARTERY-VEIN, FOUR      1996    ELBOW ARTHROSCOP,DIAGNOSTIC Left          HX APPENDECTOMY  1961    HX ORTHOPAEDIC Right     open right shoulder    HX PROSTATECTOMY      HX PTCA      stents heart    HX SHOULDER ARTHROSCOPY Right     HX UROLOGICAL      cysto      Family History   Problem Relation Age of Onset    Heart Disease Mother     Stroke Mother     Heart Attack Mother     Heart Attack Father     Heart Disease Sister         MI, CABG pt does not remember age   [facility] Heart Attack Sister     Heart Disease Brother     Heart Attack Brother     Diabetes Brother     Heart Disease Brother      Social History     Occupational History    Not on file   Tobacco Use    Smoking status: Current Every Day Smoker     Packs/day: 1.00     Years: 58.00     Pack years: 58.00    Smokeless tobacco: Never Used   Substance and Sexual Activity    Alcohol use: No    Drug use: No    Sexual activity: Not on file       Hospital Medications:  No current facility-administered medications for this encounter. Allergies: Allergies   Allergen Reactions    Motrin [Ibuprofen] Rash    Pcn [Penicillins] Swelling       Review of Systems:  A comprehensive review of systems was negative except for that written in the History of Present Illness. Objective:     Physical Exam:  Vitals:  Visit Vitals  Temp 98.5 °F (36.9 °C)   Ht 5' 6\" (1.676 m)   Wt 74.8 kg (165 lb)   BMI 26.63 kg/m²       General: No acute distress. Skin:  Extremities and face reveal no rashes. No maynard erythema. No telangiectasias on the chest wall. HEENT: Sclerae anicteric. No oral ulcers. No abnormal pigmentation of the lips. The neck is supple. Cardiovascular: Regular rate and rhythm. No murmurs, gallops, or rubs. Respiratory:  Comfortable breathing  With no accessory muscle use. Clear breath sounds with no wheezes, rales, or rhonchi. GI:  Abdomen nondistended, soft, and nontender. Normal active bowel sounds. No enlargement of the liver or spleen. No masses palpable. Musculoskeletal:  No pitting edema of the lower legs. Extremities have good range of motion. Neurological:  Gross memory appears intact. Patient is alert and oriented. Psychiatric:  Mood appears appropriate with judgement intact. Lymphatic:  No cervical or supraclavicular adenopathy. Laboratory:    No results for input(s): WBC, RBC, HGB, HCT, PLT, HGBEXT, HCTEXT, PLTEXT in the last 72 hours. No results for input(s): GLU, NA, K, CL, CO2, BUN, CREA, CA in the last 72 hours. No results for input(s): PTP, INR, APTT in the last 72 hours. No lab exists for component: INREXT  No results for input(s): TBIL, CBIL, SGOT, GPT, AP, ALB, TP, AML, LPSE in the last 72 hours.     Assessment:       A 74 y.o. male with Dysphagia, PMH gastric ulcer, CRC screening      Plan:       EGD and colonoscopy    Signed By: Madelaine Rico Blessing Marin MD     March 26, 2019

## 2019-03-26 NOTE — ANESTHESIA POSTPROCEDURE EVALUATION
Procedure(s): ESOPHAGOGASTRODUODENOSCOPY (EGD) BMI 28 COLONOSCOPY 
ESOPHAGEAL DILATION. total IV anesthesia Anesthesia Post Evaluation Multimodal analgesia: multimodal analgesia used between 6 hours prior to anesthesia start to PACU discharge Patient location during evaluation: PACU Patient participation: complete - patient participated Level of consciousness: awake Pain management: adequate Airway patency: patent Anesthetic complications: no 
Cardiovascular status: acceptable and hemodynamically stable Respiratory status: acceptable Hydration status: acceptable Comments: Acceptable for discharge from PACU. Post anesthesia nausea and vomiting:  none Vitals Value Taken Time /70 3/26/2019 11:12 AM  
Temp 36.7 °C (98 °F) 3/26/2019 11:02 AM  
Pulse 68 3/26/2019 11:13 AM  
Resp 14 3/26/2019 11:12 AM  
SpO2 92 % 3/26/2019 11:13 AM  
Vitals shown include unvalidated device data.

## 2019-06-12 ENCOUNTER — HOSPITAL ENCOUNTER (OUTPATIENT)
Dept: ULTRASOUND IMAGING | Age: 74
Discharge: HOME | End: 2019-06-12
Attending: UROLOGY
Payer: MEDICARE

## 2019-06-12 DIAGNOSIS — I71.40 ABDOMINAL AORTIC ANEURYSM (AAA) 3.0 CM TO 5.5 CM IN DIAMETER IN MALE: ICD-10-CM

## 2019-06-12 DIAGNOSIS — N28.1 RENAL CYST: ICD-10-CM

## 2019-06-12 DIAGNOSIS — C61 MALIGNANT NEOPLASM OF PROSTATE (HCC): ICD-10-CM

## 2019-06-12 PROCEDURE — 76770 US EXAM ABDO BACK WALL COMP: CPT

## 2019-07-30 ENCOUNTER — HOSPITAL ENCOUNTER (OUTPATIENT)
Dept: SURGERY | Age: 74
Discharge: HOME | End: 2019-07-30

## 2019-07-30 NOTE — PERIOP NOTES
Spoke with patient regarding appointment for Pre-assessment today. Patient stated he was in too much pain and unable to make it to appointment.  Patient instructed to call Dr. Smith Brood office to reschedule pre-assessment

## 2019-07-30 NOTE — PERIOP NOTES
Patient has no orders for pre-assessment. Called pain management clinic to request orders.  Dr. Benjy Choi to place orders when she gets to pain management clinic

## 2019-08-01 ENCOUNTER — HOSPITAL ENCOUNTER (OUTPATIENT)
Dept: SURGERY | Age: 74
Discharge: HOME | End: 2019-08-01
Payer: MEDICARE

## 2019-08-01 VITALS
OXYGEN SATURATION: 95 % | RESPIRATION RATE: 16 BRPM | WEIGHT: 156 LBS | BODY MASS INDEX: 25.07 KG/M2 | HEIGHT: 66 IN | HEART RATE: 71 BPM | SYSTOLIC BLOOD PRESSURE: 113 MMHG | DIASTOLIC BLOOD PRESSURE: 61 MMHG | TEMPERATURE: 97.6 F

## 2019-08-01 LAB
ANION GAP SERPL CALC-SCNC: 5 MMOL/L (ref 7–16)
APPEARANCE UR: CLEAR
BACTERIA SPEC CULT: NORMAL
BACTERIA URNS QL MICRO: 0 /HPF
BASOPHILS # BLD: 0.1 K/UL (ref 0–0.2)
BASOPHILS NFR BLD: 1 % (ref 0–2)
BILIRUB UR QL: NEGATIVE
BUN SERPL-MCNC: 27 MG/DL (ref 8–23)
CALCIUM SERPL-MCNC: 9 MG/DL (ref 8.3–10.4)
CASTS URNS QL MICRO: ABNORMAL /LPF
CHLORIDE SERPL-SCNC: 109 MMOL/L (ref 98–107)
CO2 SERPL-SCNC: 27 MMOL/L (ref 21–32)
COLOR UR: YELLOW
CREAT SERPL-MCNC: 1.64 MG/DL (ref 0.8–1.5)
DIFFERENTIAL METHOD BLD: ABNORMAL
EOSINOPHIL # BLD: 0.3 K/UL (ref 0–0.8)
EOSINOPHIL NFR BLD: 4 % (ref 0.5–7.8)
EPI CELLS #/AREA URNS HPF: ABNORMAL /HPF
ERYTHROCYTE [DISTWIDTH] IN BLOOD BY AUTOMATED COUNT: 13.4 % (ref 11.9–14.6)
GLUCOSE BLD STRIP.AUTO-MCNC: 108 MG/DL (ref 65–100)
GLUCOSE SERPL-MCNC: 100 MG/DL (ref 65–100)
GLUCOSE UR STRIP.AUTO-MCNC: NEGATIVE MG/DL
HCT VFR BLD AUTO: 40.5 % (ref 41.1–50.3)
HGB BLD-MCNC: 12.8 G/DL (ref 13.6–17.2)
HGB UR QL STRIP: ABNORMAL
IMM GRANULOCYTES # BLD AUTO: 0 K/UL (ref 0–0.5)
IMM GRANULOCYTES NFR BLD AUTO: 0 % (ref 0–5)
KETONES UR QL STRIP.AUTO: NEGATIVE MG/DL
LEUKOCYTE ESTERASE UR QL STRIP.AUTO: NEGATIVE
LYMPHOCYTES # BLD: 3 K/UL (ref 0.5–4.6)
LYMPHOCYTES NFR BLD: 31 % (ref 13–44)
MCH RBC QN AUTO: 30.7 PG (ref 26.1–32.9)
MCHC RBC AUTO-ENTMCNC: 31.6 G/DL (ref 31.4–35)
MCV RBC AUTO: 97.1 FL (ref 79.6–97.8)
MONOCYTES # BLD: 0.7 K/UL (ref 0.1–1.3)
MONOCYTES NFR BLD: 7 % (ref 4–12)
NEUTS SEG # BLD: 5.6 K/UL (ref 1.7–8.2)
NEUTS SEG NFR BLD: 58 % (ref 43–78)
NITRITE UR QL STRIP.AUTO: NEGATIVE
NRBC # BLD: 0 K/UL (ref 0–0.2)
PH UR STRIP: 5 [PH] (ref 5–9)
PLATELET # BLD AUTO: 152 K/UL (ref 150–450)
PLATELET # BLD AUTO: 153 K/UL (ref 150–450)
PMV BLD AUTO: 12.3 FL (ref 9.4–12.3)
POTASSIUM SERPL-SCNC: 3.9 MMOL/L (ref 3.5–5.1)
PROT UR STRIP-MCNC: NEGATIVE MG/DL
RBC # BLD AUTO: 4.17 M/UL (ref 4.23–5.6)
RBC #/AREA URNS HPF: ABNORMAL /HPF
SERVICE CMNT-IMP: NORMAL
SODIUM SERPL-SCNC: 141 MMOL/L (ref 136–145)
SP GR UR REFRACTOMETRY: 1.01 (ref 1–1.02)
UROBILINOGEN UR QL STRIP.AUTO: 0.2 EU/DL (ref 0.2–1)
WBC # BLD AUTO: 9.7 K/UL (ref 4.3–11.1)
WBC URNS QL MICRO: ABNORMAL /HPF

## 2019-08-01 PROCEDURE — 81001 URINALYSIS AUTO W/SCOPE: CPT

## 2019-08-01 PROCEDURE — 80048 BASIC METABOLIC PNL TOTAL CA: CPT

## 2019-08-01 PROCEDURE — 85025 COMPLETE CBC W/AUTO DIFF WBC: CPT

## 2019-08-01 PROCEDURE — 87641 MR-STAPH DNA AMP PROBE: CPT

## 2019-08-01 PROCEDURE — 82962 GLUCOSE BLOOD TEST: CPT

## 2019-08-01 PROCEDURE — 85049 AUTOMATED PLATELET COUNT: CPT

## 2019-08-01 PROCEDURE — 93005 ELECTROCARDIOGRAM TRACING: CPT | Performed by: ANESTHESIOLOGY

## 2019-08-01 RX ORDER — NALOXEGOL OXALATE 25 MG/1
TABLET, FILM COATED ORAL
Refills: 1 | COMMUNITY
Start: 2019-06-04

## 2019-08-01 NOTE — PERIOP NOTES
Recent Results (from the past 12 hour(s))   URINALYSIS W/ RFLX MICROSCOPIC    Collection Time: 08/01/19  3:19 PM   Result Value Ref Range    Color YELLOW      Appearance CLEAR      Specific gravity 1.009 1.001 - 1.023      pH (UA) 5.0 5.0 - 9.0      Protein NEGATIVE  NEG mg/dL    Glucose NEGATIVE  mg/dL    Ketone NEGATIVE  NEG mg/dL    Bilirubin NEGATIVE  NEG      Blood MODERATE (A) NEG      Urobilinogen 0.2 0.2 - 1.0 EU/dL    Nitrites NEGATIVE  NEG      Leukocyte Esterase NEGATIVE  NEG      WBC 3-5 0 /hpf    RBC 20-50 0 /hpf    Epithelial cells 0-3 0 /hpf    Bacteria 0 0 /hpf    Casts 0-3 0 /lpf   CBC WITH AUTOMATED DIFF    Collection Time: 08/01/19  3:23 PM   Result Value Ref Range    WBC 9.7 4.3 - 11.1 K/uL    RBC 4.17 (L) 4.23 - 5.6 M/uL    HGB 12.8 (L) 13.6 - 17.2 g/dL    HCT 40.5 (L) 41.1 - 50.3 %    MCV 97.1 79.6 - 97.8 FL    MCH 30.7 26.1 - 32.9 PG    MCHC 31.6 31.4 - 35.0 g/dL    RDW 13.4 11.9 - 14.6 %    PLATELET 407 597 - 024 K/uL    MPV 12.3 9.4 - 12.3 FL    ABSOLUTE NRBC 0.00 0.0 - 0.2 K/uL    DF AUTOMATED      NEUTROPHILS 58 43 - 78 %    LYMPHOCYTES 31 13 - 44 %    MONOCYTES 7 4.0 - 12.0 %    EOSINOPHILS 4 0.5 - 7.8 %    BASOPHILS 1 0.0 - 2.0 %    IMMATURE GRANULOCYTES 0 0.0 - 5.0 %    ABS. NEUTROPHILS 5.6 1.7 - 8.2 K/UL    ABS. LYMPHOCYTES 3.0 0.5 - 4.6 K/UL    ABS. MONOCYTES 0.7 0.1 - 1.3 K/UL    ABS. EOSINOPHILS 0.3 0.0 - 0.8 K/UL    ABS. BASOPHILS 0.1 0.0 - 0.2 K/UL    ABS. IMM.  GRANS. 0.0 0.0 - 0.5 K/UL   METABOLIC PANEL, BASIC    Collection Time: 08/01/19  3:23 PM   Result Value Ref Range    Sodium 141 136 - 145 mmol/L    Potassium 3.9 3.5 - 5.1 mmol/L    Chloride 109 (H) 98 - 107 mmol/L    CO2 27 21 - 32 mmol/L    Anion gap 5 (L) 7 - 16 mmol/L    Glucose 100 65 - 100 mg/dL    BUN 27 (H) 8 - 23 MG/DL    Creatinine 1.64 (H) 0.8 - 1.5 MG/DL    GFR est AA 53 (L) >60 ml/min/1.73m2    GFR est non-AA 44 (L) >60 ml/min/1.73m2    Calcium 9.0 8.3 - 10.4 MG/DL   PLATELET COUNT    Collection Time: 08/01/19 3:25 PM   Result Value Ref Range    PLATELET 973 548 - 024 K/uL   GLUCOSE, POC    Collection Time: 08/01/19  3:34 PM   Result Value Ref Range    Glucose (POC) 108 (H) 65 - 100 mg/dL     Reviewed; spoke with Jayshree Sampson at Dr. Vivas [facility] office to report creatinine of 1.64. Per Jayshree Sampson, he would notify Dr. Athena Hatchet today.  Labs also routed to Dr. Athena Hatchet

## 2019-08-01 NOTE — PERIOP NOTES
Patient verified name, , and surgery as listed in [facility]. Patient provided medical/health information and PTA medications to the best of their ability. TYPE  CASE: IB  Orders per surgeon: were received   Labs per surgeon: cbc, bmp, UA, and EKG  Labs per anesthesia protocol: none. EKG:  NSR with PAC and prolong qt; DR. Fulton notified. No new orders received. Also routed to Dr. Russ Michael    POC glucose  108. Instructed  Patient that if blood sugar 300 or > , surgery may be cancelled. Nasal Swab collected per MD order and instructions for Mupirocin nasal ointment if required. Patient provided with and instructed on education handouts including Guide to Surgery, blood transfusions, pain management, and hand hygiene for the family and community, and [facility] brochure. Road to Recovery Spine surgery patient guide given. Long handled prehab sponge given with instructions for use. Hibiclens and instructions given per hospital policy. Instructed patient to continue previous medications as prescribed prior to surgery unless otherwise directed and to take the following medications the day of surgery according to anesthesia guidelines : aspirin, citalopram, metoprolol, oxycodone, pantoprazole, topiramate, nebulizer and inhaler. Instructed patient to hold  the following medications on the day of surgery: vitamins, supplements, and NSAIDS. Original medication prescription bottles were not visualized during patient appointment. Patient teach back successful and patient demonstrates knowledge of instruction.

## 2019-08-02 LAB
ATRIAL RATE: 63 BPM
CALCULATED P AXIS, ECG09: 54 DEGREES
CALCULATED R AXIS, ECG10: 51 DEGREES
CALCULATED T AXIS, ECG11: 66 DEGREES
DIAGNOSIS, 93000: NORMAL
P-R INTERVAL, ECG05: 174 MS
Q-T INTERVAL, ECG07: 474 MS
QRS DURATION, ECG06: 106 MS
QTC CALCULATION (BEZET), ECG08: 485 MS
VENTRICULAR RATE, ECG03: 63 BPM

## 2019-08-03 ENCOUNTER — ANESTHESIA EVENT (OUTPATIENT)
Dept: SURGERY | Age: 74
End: 2019-08-03
Payer: MEDICARE

## 2019-08-05 ENCOUNTER — APPOINTMENT (OUTPATIENT)
Dept: GENERAL RADIOLOGY | Age: 74
End: 2019-08-05
Attending: ANESTHESIOLOGY
Payer: MEDICARE

## 2019-08-05 ENCOUNTER — ANESTHESIA (OUTPATIENT)
Dept: SURGERY | Age: 74
End: 2019-08-05
Payer: MEDICARE

## 2019-08-05 ENCOUNTER — HOSPITAL ENCOUNTER (OUTPATIENT)
Age: 74
Setting detail: OUTPATIENT SURGERY
Discharge: HOME | End: 2019-08-05
Attending: ANESTHESIOLOGY | Admitting: ANESTHESIOLOGY
Payer: MEDICARE

## 2019-08-05 VITALS
RESPIRATION RATE: 15 BRPM | OXYGEN SATURATION: 93 % | DIASTOLIC BLOOD PRESSURE: 67 MMHG | TEMPERATURE: 98 F | HEIGHT: 66 IN | HEART RATE: 58 BPM | SYSTOLIC BLOOD PRESSURE: 134 MMHG | BODY MASS INDEX: 24.59 KG/M2 | WEIGHT: 153 LBS

## 2019-08-05 DIAGNOSIS — G89.4 CHRONIC PAIN SYNDROME: ICD-10-CM

## 2019-08-05 DIAGNOSIS — M54.16 LUMBAR RADICULOPATHY: Primary | ICD-10-CM

## 2019-08-05 LAB — GLUCOSE BLD STRIP.AUTO-MCNC: 141 MG/DL (ref 65–100)

## 2019-08-05 PROCEDURE — 74011000250 HC RX REV CODE- 250: Performed by: ANESTHESIOLOGY

## 2019-08-05 PROCEDURE — 77030038091: Performed by: ANESTHESIOLOGY

## 2019-08-05 PROCEDURE — 77030018673: Performed by: ANESTHESIOLOGY

## 2019-08-05 PROCEDURE — 76060000035 HC ANESTHESIA 2 TO 2.5 HR: Performed by: ANESTHESIOLOGY

## 2019-08-05 PROCEDURE — 74011250636 HC RX REV CODE- 250/636: Performed by: ANESTHESIOLOGY

## 2019-08-05 PROCEDURE — 77030029981: Performed by: ANESTHESIOLOGY

## 2019-08-05 PROCEDURE — C1713 ANCHOR/SCREW BN/BN,TIS/BN: HCPCS | Performed by: ANESTHESIOLOGY

## 2019-08-05 PROCEDURE — 74011250636 HC RX REV CODE- 250/636

## 2019-08-05 PROCEDURE — 76210000020 HC REC RM PH II FIRST 0.5 HR: Performed by: ANESTHESIOLOGY

## 2019-08-05 PROCEDURE — 74011250637 HC RX REV CODE- 250/637: Performed by: ANESTHESIOLOGY

## 2019-08-05 PROCEDURE — 77030027560: Performed by: ANESTHESIOLOGY

## 2019-08-05 PROCEDURE — 76010000131 HC OR TIME 2 TO 2.5 HR: Performed by: ANESTHESIOLOGY

## 2019-08-05 PROCEDURE — C1778 LEAD, NEUROSTIMULATOR: HCPCS | Performed by: ANESTHESIOLOGY

## 2019-08-05 PROCEDURE — 77030031139 HC SUT VCRL2 J&J -A: Performed by: ANESTHESIOLOGY

## 2019-08-05 PROCEDURE — 77030003666 HC NDL SPINAL BD -A: Performed by: ANESTHESIOLOGY

## 2019-08-05 PROCEDURE — 77030002912 HC SUT ETHBND J&J -A: Performed by: ANESTHESIOLOGY

## 2019-08-05 PROCEDURE — 76210000063 HC OR PH I REC FIRST 0.5 HR: Performed by: ANESTHESIOLOGY

## 2019-08-05 PROCEDURE — C1820 GENERATOR NEURO RECHG BAT SY: HCPCS | Performed by: ANESTHESIOLOGY

## 2019-08-05 PROCEDURE — 82962 GLUCOSE BLOOD TEST: CPT

## 2019-08-05 PROCEDURE — 77030008462 HC STPLR SKN PROX J&J -A: Performed by: ANESTHESIOLOGY

## 2019-08-05 DEVICE — ANCHOR SPNE NEUROSTIM NEXT GEN -- CLIK SC-4316: Type: IMPLANTABLE DEVICE | Site: BACK | Status: FUNCTIONAL

## 2019-08-05 DEVICE — IMPLANTABLE DEVICE: Type: IMPLANTABLE DEVICE | Site: BACK | Status: FUNCTIONAL

## 2019-08-05 DEVICE — GENERATOR PULSE IMPL -- SPECTRA WAVEWRITER SC-1160: Type: IMPLANTABLE DEVICE | Site: BACK | Status: FUNCTIONAL

## 2019-08-05 RX ORDER — SODIUM CHLORIDE, SODIUM LACTATE, POTASSIUM CHLORIDE, CALCIUM CHLORIDE 600; 310; 30; 20 MG/100ML; MG/100ML; MG/100ML; MG/100ML
150 INJECTION, SOLUTION INTRAVENOUS CONTINUOUS
Status: DISCONTINUED | OUTPATIENT
Start: 2019-08-05 | End: 2019-08-05 | Stop reason: HOSPADM

## 2019-08-05 RX ORDER — LIDOCAINE HYDROCHLORIDE 10 MG/ML
0.1 INJECTION INFILTRATION; PERINEURAL AS NEEDED
Status: DISCONTINUED | OUTPATIENT
Start: 2019-08-05 | End: 2019-08-05 | Stop reason: HOSPADM

## 2019-08-05 RX ORDER — OXYCODONE HYDROCHLORIDE 10 MG/1
10 TABLET ORAL
Qty: 28 TAB | Refills: 0 | Status: SHIPPED | OUTPATIENT
Start: 2019-08-05 | End: 2019-08-12

## 2019-08-05 RX ORDER — PROPOFOL 10 MG/ML
INJECTION, EMULSION INTRAVENOUS AS NEEDED
Status: DISCONTINUED | OUTPATIENT
Start: 2019-08-05 | End: 2019-08-05 | Stop reason: HOSPADM

## 2019-08-05 RX ORDER — FENTANYL CITRATE 50 UG/ML
100 INJECTION, SOLUTION INTRAMUSCULAR; INTRAVENOUS ONCE
Status: DISCONTINUED | OUTPATIENT
Start: 2019-08-05 | End: 2019-08-05 | Stop reason: HOSPADM

## 2019-08-05 RX ORDER — SODIUM CHLORIDE 9 MG/ML
INJECTION INTRAMUSCULAR; INTRAVENOUS; SUBCUTANEOUS AS NEEDED
Status: DISCONTINUED | OUTPATIENT
Start: 2019-08-05 | End: 2019-08-05 | Stop reason: HOSPADM

## 2019-08-05 RX ORDER — SODIUM CHLORIDE 9 MG/ML
50 INJECTION, SOLUTION INTRAVENOUS CONTINUOUS
Status: DISCONTINUED | OUTPATIENT
Start: 2019-08-05 | End: 2019-08-05 | Stop reason: HOSPADM

## 2019-08-05 RX ORDER — CLINDAMYCIN HYDROCHLORIDE 300 MG/1
300 CAPSULE ORAL 3 TIMES DAILY
Qty: 21 CAP | Refills: 0 | Status: SHIPPED | OUTPATIENT
Start: 2019-08-05 | End: 2019-08-12

## 2019-08-05 RX ORDER — HYDROMORPHONE HYDROCHLORIDE 2 MG/ML
0.5 INJECTION, SOLUTION INTRAMUSCULAR; INTRAVENOUS; SUBCUTANEOUS
Status: DISCONTINUED | OUTPATIENT
Start: 2019-08-05 | End: 2019-08-05 | Stop reason: HOSPADM

## 2019-08-05 RX ORDER — BACITRACIN 50000 [IU]/1
INJECTION, POWDER, FOR SOLUTION INTRAMUSCULAR AS NEEDED
Status: DISCONTINUED | OUTPATIENT
Start: 2019-08-05 | End: 2019-08-05 | Stop reason: HOSPADM

## 2019-08-05 RX ORDER — PROPOFOL 10 MG/ML
INJECTION, EMULSION INTRAVENOUS
Status: DISCONTINUED | OUTPATIENT
Start: 2019-08-05 | End: 2019-08-05 | Stop reason: HOSPADM

## 2019-08-05 RX ORDER — CLINDAMYCIN PHOSPHATE 900 MG/50ML
900 INJECTION INTRAVENOUS ONCE
Status: COMPLETED | OUTPATIENT
Start: 2019-08-05 | End: 2019-08-05

## 2019-08-05 RX ORDER — HYDROCODONE BITARTRATE AND ACETAMINOPHEN 5; 325 MG/1; MG/1
1 TABLET ORAL AS NEEDED
Status: DISCONTINUED | OUTPATIENT
Start: 2019-08-05 | End: 2019-08-05 | Stop reason: HOSPADM

## 2019-08-05 RX ORDER — ACETAMINOPHEN 500 MG
1000 TABLET ORAL
Status: DISCONTINUED | OUTPATIENT
Start: 2019-08-05 | End: 2019-08-05 | Stop reason: HOSPADM

## 2019-08-05 RX ORDER — LIDOCAINE HYDROCHLORIDE 20 MG/ML
INJECTION, SOLUTION INFILTRATION; PERINEURAL AS NEEDED
Status: DISCONTINUED | OUTPATIENT
Start: 2019-08-05 | End: 2019-08-05 | Stop reason: HOSPADM

## 2019-08-05 RX ORDER — MIDAZOLAM HYDROCHLORIDE 1 MG/ML
2 INJECTION, SOLUTION INTRAMUSCULAR; INTRAVENOUS
Status: DISCONTINUED | OUTPATIENT
Start: 2019-08-05 | End: 2019-08-05 | Stop reason: HOSPADM

## 2019-08-05 RX ORDER — LIDOCAINE HYDROCHLORIDE 20 MG/ML
INJECTION, SOLUTION EPIDURAL; INFILTRATION; INTRACAUDAL; PERINEURAL AS NEEDED
Status: DISCONTINUED | OUTPATIENT
Start: 2019-08-05 | End: 2019-08-05 | Stop reason: HOSPADM

## 2019-08-05 RX ORDER — FAMOTIDINE 20 MG/1
20 TABLET, FILM COATED ORAL ONCE
Status: COMPLETED | OUTPATIENT
Start: 2019-08-05 | End: 2019-08-05

## 2019-08-05 RX ORDER — BUPIVACAINE HYDROCHLORIDE 5 MG/ML
INJECTION, SOLUTION EPIDURAL; INTRACAUDAL AS NEEDED
Status: DISCONTINUED | OUTPATIENT
Start: 2019-08-05 | End: 2019-08-05 | Stop reason: HOSPADM

## 2019-08-05 RX ADMIN — SODIUM CHLORIDE, SODIUM LACTATE, POTASSIUM CHLORIDE, AND CALCIUM CHLORIDE: 600; 310; 30; 20 INJECTION, SOLUTION INTRAVENOUS at 16:52

## 2019-08-05 RX ADMIN — PROPOFOL 20 MG: 10 INJECTION, EMULSION INTRAVENOUS at 16:01

## 2019-08-05 RX ADMIN — PROPOFOL 20 MG: 10 INJECTION, EMULSION INTRAVENOUS at 15:51

## 2019-08-05 RX ADMIN — PROPOFOL 20 MG: 10 INJECTION, EMULSION INTRAVENOUS at 16:07

## 2019-08-05 RX ADMIN — FAMOTIDINE 20 MG: 20 TABLET ORAL at 13:30

## 2019-08-05 RX ADMIN — CLINDAMYCIN PHOSPHATE 900 MG: 900 INJECTION, SOLUTION INTRAVENOUS at 15:04

## 2019-08-05 RX ADMIN — PROPOFOL 20 MG: 10 INJECTION, EMULSION INTRAVENOUS at 15:56

## 2019-08-05 RX ADMIN — LIDOCAINE HYDROCHLORIDE 40 MG: 20 INJECTION, SOLUTION EPIDURAL; INFILTRATION; INTRACAUDAL; PERINEURAL at 15:17

## 2019-08-05 RX ADMIN — PROPOFOL 20 MG: 10 INJECTION, EMULSION INTRAVENOUS at 16:09

## 2019-08-05 RX ADMIN — SODIUM CHLORIDE, SODIUM LACTATE, POTASSIUM CHLORIDE, AND CALCIUM CHLORIDE 150 ML/HR: 600; 310; 30; 20 INJECTION, SOLUTION INTRAVENOUS at 13:30

## 2019-08-05 RX ADMIN — PROPOFOL 30 MG: 10 INJECTION, EMULSION INTRAVENOUS at 15:20

## 2019-08-05 RX ADMIN — PROPOFOL 180 MCG/KG/MIN: 10 INJECTION, EMULSION INTRAVENOUS at 15:17

## 2019-08-05 NOTE — DISCHARGE INSTRUCTIONS
ACTIVITY  · As tolerated and as directed by your doctor. · Bathe or shower as directed by your doctor. DIET  · Clear liquids until no nausea or vomiting; then light diet for the first day. · Advance to regular diet on second day, unless your doctor orders otherwise. · If nausea and vomiting continues, call your doctor. PAIN  · Take pain medication as directed by your doctor. · Call your doctor if pain is NOT relieved by medication. · DO NOT take aspirin of blood thinners unless directed by your doctor. DRESSING CARE       CALL YOUR DOCTOR IF   · Excessive bleeding that does not stop after holding pressure over the area  · Temperature of 101 degrees F or above  · Excessive redness, swelling or bruising, and/ or green or yellow, smelly discharge from incision    AFTER ANESTHESIA   · For the first 24 hours: DO NOT Drive, Drink alcoholic beverages, or Make important decisions. · Be aware of dizziness following anesthesia and while taking pain medication. APPOINTMENT DATE/ TIME    YOUR DOCTOR'S PHONE NUMBER       DISCHARGE SUMMARY from Nurse    PATIENT INSTRUCTIONS:    After general anesthesia or intravenous sedation, for 24 hours or while taking prescription Narcotics:  · Limit your activities  · Do not drive and operate hazardous machinery  · Do not make important personal or business decisions  · Do  not drink alcoholic beverages  · If you have not urinated within 8 hours after discharge, please contact your surgeon on call. *  Please give a list of your current medications to your Primary Care Provider. *  Please update this list whenever your medications are discontinued, doses are      changed, or new medications (including over-the-counter products) are added. *  Please carry medication information at all times in case of emergency situations.       These are general instructions for a healthy lifestyle:    No smoking/ No tobacco products/ Avoid exposure to second hand smoke    Surgeon General's Warning:  Quitting smoking now greatly reduces serious risk to your health. Obesity, smoking, and sedentary lifestyle greatly increases your risk for illness    A healthy diet, regular physical exercise & weight monitoring are important for maintaining a healthy lifestyle    You may be retaining fluid if you have a history of heart failure or if you experience any of the following symptoms:  Weight gain of 3 pounds or more overnight or 5 pounds in a week, increased swelling in our hands or feet or shortness of breath while lying flat in bed. Please call your doctor as soon as you notice any of these symptoms; do not wait until your next office visit. Recognize signs and symptoms of STROKE:    F-face looks uneven    A-arms unable to move or move unevenly    S-speech slurred or non-existent    T-time-call 911 as soon as signs and symptoms begin-DO NOT go       Back to bed or wait to see if you get better-TIME IS BRAIN. No bending, lifting greater than a gallon of milk, or twisting for 6-8 weeks. No driving until staples are removed. No NSAIDs for 24 hours. Leave dressing in place for 72 hours then you can shower normally (no tub baths/swimming until after post-op visit). Post-op visit in 10-14 days.

## 2019-08-05 NOTE — OP NOTES
[facility], [facility]    Location: [facility]    Dual Lead Alfred Spinal Cord Stimulator Implant    Date: 8/5/2019      Indication: Lumbar Radiculopathy, Chronic Pain Syndrome     Allergies/Blood Thinners Reviewed: Yes    Operative Surgery Record    Preoperative diagnosis:  1. Lumbar Radiculopathy    2. Chronic pain syndrome    Postoperative diagnosis:  Same    Procedure:  1. Implantation of dual Alfred spinal cord stimulator octrode leads for permanent stimulation    2. Implantation subcutaneous Alfred spinal cord stimulator generator for permanent stimulation    Surgeon: Alma Alva M.D. Assistant: None    Anesthesia: MAC    Estimated blood loss: 15 mL    Complications: None    Procedure:    After informed consent was obtained and the patient was identified in the holding area surgical area was marked with my initials in the left supragluteal area for generator placement per the patient's request. The procedure was then discussed with the patient. Possible risks and complications were discussed. Alternative therapies were once again discussed. The patient expressed understanding of all of these and expressed desire to proceed with the procedure. The patient was taken to the operating room. Monitored anesthesia care was then provided. The patient was turned in the prone position on a padded cushion with all pressure points checked and padded. For IV antibiotic prophylaxis 900mg of clindamycin were administered. A 3 inch line was marked in the left supragluteal area for generator placement. A 2 inch vertical line was marked in the L1/L2 level in the midline for lead placement. The surgical area was then prepped with ChloraPrep and draped in the usual sterile fashion. Proper timeout procedure was then performed with all members of the surgical team in agreement with the patient identified and procedure to be performed.     Both surgical sites were injected with an equal mixture of 0.5% Bupivacine with Epinephrine and 2% Lidocaine for local anesthesia. Incision was made first in the midline at the L1/L2 level and using electrocautery dissection was taken down to the supraspinous ligament. A modified 14-gauge Touhy needle by [employer] was then advanced to the right of the midline using shallow angle approach and loss-of-resistance technique through the L1/L2 interspace to the epidural space. A [place] epidural lead was then advanced through the needle until the #1 contact was at the top of the T9 vertebral level to the left of midline. A second needle was then advanced to the left of midline using a similar approach, but lead was unable to be advanced into the posterior epidural space using multiplanar fluoroscopy. A Coude needle was attempted at the same level with similar outcome. A Coude needle was then introduced on the right at the L2/3 level with a similar outcome. Then a Coude needle was introduced on the left of midline at the L2/3 level and the posterior epidural space was located and a lead was threaded uneventfully to the top of the T9 vertebral level. Lateral views were then obtained confirming placement of the leads in the posterior epidural space. Patient then underwent intraoperative testing and was found to have coverage in all areas the patient typically has pain. The needles were then removed leaving the leads in place. The lead was secured to the paraspinous fascia and interspinous ligament with fixate suturing device locking anchoring sleeve on each lead (one of the fixate devices had failure of the suture and was replaced). Incision was made along the previously marked line for generator placement. Using electrocautery and blunt dissection to open up the generator pocket subcutaneously to accommodate the [employer]  generator. Tunneling shan was used then to tunnel both leads down to the generator pocket.  Leads were then passed into the ports of the [place] generator and set screws tightened, with two pins placed into unused ports. Trial impedance was then checked and excellent impedance was found on all 16 electrodes contacts. The wounds were copiously irrigated with saline solution. Hemostasis was assured. The generator was then passed into the generator pocket and all incisions were then closed with interrupted 2-0 and in the midline 3-0 Vicryl stitches and staples. Dressings were then applied. The patient tolerated the procedure well and was taken to the recovery room for recovery before discharge. Discharge instructions were given. Prescriptions for oxycodone and clindamycin were given. Patient to follow-up in 12-14 days.      Ariana Badillo M.D.

## 2019-08-05 NOTE — H&P
History and Physical    Patient with Lumbar Radiculopathy and Chronic Pain Syndrome presenting for SCS permanent implantation. Patient with > 50% relief from trial and desires to have permament implantation. Patient reports he desires left sided battery. He denies any blood thinning medication. O: Patient regular rate and rhythm with no appreciable murmurs today. EKG with conduction abnormality. Patient's lungs are clear to auscultation. Patient is in no distress and is well appearing. Patient with some poor dentition present, otherwise HEENT externally normal.  Patient with no obvious masses. Patient in good spirits as always. A/P: We will proceed to [facility] Scientific Implantation of SCS with dual lead with left sided battery. Patient is aware he will need follow-up with cardiology post-operatively.      Ludwin Damon MD

## 2019-08-05 NOTE — INTERVAL H&P NOTE
H&P Update:  Zuleyma Win was seen and examined. History and physical has been reviewed. The patient has been examined. There have been no significant clinical changes since the completion of the originally dated History and Physical.    Left sided battery location marked.

## 2019-08-05 NOTE — ANESTHESIA POSTPROCEDURE EVALUATION
Procedure(s):  SPINAL CORD STIMULATOR INSERTION. total IV anesthesia    Anesthesia Post Evaluation      Multimodal analgesia: multimodal analgesia used between 6 hours prior to anesthesia start to PACU discharge  Patient location during evaluation: bedside  Patient participation: complete - patient participated  Level of consciousness: awake and alert  Pain management: adequate  Airway patency: patent  Anesthetic complications: no  Cardiovascular status: hemodynamically stable  Respiratory status: spontaneous ventilation  Hydration status: euvolemic  Comments: Patient stable and may discharge at this time. Vitals Value Taken Time   /67 8/5/2019  5:35 PM   Temp 37.2 °C (98.9 °F) 8/5/2019  5:27 PM   Pulse 57 8/5/2019  5:45 PM   Resp 16 8/5/2019  5:35 PM   SpO2 95 % 8/5/2019  5:45 PM   Vitals shown include unvalidated device data.

## 2019-08-05 NOTE — ANESTHESIA PREPROCEDURE EVALUATION
Anesthetic History   No history of anesthetic complications            Review of Systems / Medical History  Patient summary reviewed and pertinent labs reviewed    Pulmonary    COPD: moderate      Shortness of breath and smoker         Neuro/Psych         Psychiatric history (PTSD)     Cardiovascular    Hypertension: well controlled          Past MI, CAD, cardiac stents (Multiple, 15 last several years ago, taking his asa) and CABG (1996)    Exercise tolerance: >4 METS  Comments: Denies recent CP, SOB or Palpitations    Echo 5/2018 showing EF 55-65% with a mildly dilated aortic root and ascending aorta, mild TR.   GI/Hepatic/Renal     GERD: well controlled           Endo/Other    Diabetes: type 2    Arthritis     Other Findings              Physical Exam    Airway  Mallampati: II  TM Distance: 4 - 6 cm  Neck ROM: normal range of motion   Mouth opening: Normal     Cardiovascular  Regular rate and rhythm,  S1 and S2 normal,  no murmur, click, rub, or gallop  Rhythm: regular  Rate: normal         Dental    Dentition: Full upper dentures     Pulmonary  Breath sounds clear to auscultation          Prolonged expiration     Abdominal  GI exam deferred       Other Findings            Anesthetic Plan    ASA: 3  Anesthesia type: total IV anesthesia          Induction: Intravenous  Anesthetic plan and risks discussed with: Patient and Spouse      Took BP Meds and ASA 81mg this AM. Off Plavix for 3 months

## 2019-10-15 ENCOUNTER — HOSPITAL ENCOUNTER (OUTPATIENT)
Dept: GENERAL RADIOLOGY | Age: 74
Discharge: HOME | End: 2019-10-15
Payer: MEDICARE

## 2019-10-15 DIAGNOSIS — M54.2 NECK PAIN: ICD-10-CM

## 2019-10-15 DIAGNOSIS — M25.512 LEFT SHOULDER PAIN: ICD-10-CM

## 2019-10-15 DIAGNOSIS — M25.511 RIGHT SHOULDER PAIN: ICD-10-CM

## 2019-10-15 PROCEDURE — 72050 X-RAY EXAM NECK SPINE 4/5VWS: CPT

## 2019-10-15 PROCEDURE — 73030 X-RAY EXAM OF SHOULDER: CPT

## 2020-09-24 NOTE — PERIOP NOTES
LM on Dr Woods's MA line questioning covid test status. Awaiting return call. Pt called to inquire of covid test. Pt stated he was not told he needed a swab prior to surgery and has not had it completed.

## 2020-09-27 ENCOUNTER — ANESTHESIA EVENT (OUTPATIENT)
Dept: SURGERY | Age: 75
End: 2020-09-27
Payer: MEDICARE

## 2020-09-27 NOTE — ANESTHESIA PREPROCEDURE EVALUATION
Relevant Problems   No relevant active problems       Anesthetic History               Review of Systems / Medical History  Patient summary reviewed, nursing notes reviewed and pertinent labs reviewed    Pulmonary    COPD: moderate      Smoker (64 pk-yr hx)         Neuro/Psych             Comments: PTSD    [place] [facility]    Hypertension: well controlled          Past MI and CAD (25 yrs s/p CABG. Multiple PCI w stents)    Exercise tolerance: <4 METS  Comments: TTE  May 2019:  LVEF 55-65%.   Mild TR   GI/Hepatic/Renal         Renal disease: stones       Endo/Other    Diabetes: well controlled, type 2    Arthritis and cancer     Other Findings            Physical Exam    Airway  Mallampati: II  TM Distance: > 6 cm  Neck ROM: decreased range of motion   Mouth opening: Normal     Cardiovascular  Regular rate and rhythm,  S1 and S2 normal,  no murmur, click, rub, or gallop             Dental    Dentition: Full upper dentures and Edentulous     Pulmonary      Decreased breath sounds: bilateral           Abdominal  GI exam deferred       Other Findings            Anesthetic Plan    ASA: 3  Anesthesia type: total IV anesthesia            Anesthetic plan and risks discussed with: Patient

## 2020-09-28 ENCOUNTER — ANESTHESIA (OUTPATIENT)
Dept: SURGERY | Age: 75
End: 2020-09-28
Payer: MEDICARE

## 2020-09-28 ENCOUNTER — HOSPITAL ENCOUNTER (OUTPATIENT)
Age: 75
Setting detail: OUTPATIENT SURGERY
Discharge: HOME | End: 2020-09-28
Attending: ANESTHESIOLOGY | Admitting: ANESTHESIOLOGY
Payer: MEDICARE

## 2020-09-28 VITALS
BODY MASS INDEX: 23.76 KG/M2 | OXYGEN SATURATION: 91 % | SYSTOLIC BLOOD PRESSURE: 138 MMHG | HEART RATE: 66 BPM | WEIGHT: 145 LBS | RESPIRATION RATE: 16 BRPM | DIASTOLIC BLOOD PRESSURE: 67 MMHG | TEMPERATURE: 98 F

## 2020-09-28 DIAGNOSIS — G89.4 CHRONIC PAIN SYNDROME: Primary | ICD-10-CM

## 2020-09-28 LAB — GLUCOSE BLD STRIP.AUTO-MCNC: 106 MG/DL (ref 65–100)

## 2020-09-28 PROCEDURE — 74011250636 HC RX REV CODE- 250/636: Performed by: ANESTHESIOLOGY

## 2020-09-28 PROCEDURE — 74011250636 HC RX REV CODE- 250/636: Performed by: REGISTERED NURSE

## 2020-09-28 PROCEDURE — 77030008462 HC STPLR SKN PROX J&J -A: Performed by: ANESTHESIOLOGY

## 2020-09-28 PROCEDURE — 76010000149 HC OR TIME 1 TO 1.5 HR: Performed by: ANESTHESIOLOGY

## 2020-09-28 PROCEDURE — 74011000250 HC RX REV CODE- 250: Performed by: REGISTERED NURSE

## 2020-09-28 PROCEDURE — 76060000033 HC ANESTHESIA 1 TO 1.5 HR: Performed by: ANESTHESIOLOGY

## 2020-09-28 PROCEDURE — 74011000250 HC RX REV CODE- 250: Performed by: ANESTHESIOLOGY

## 2020-09-28 PROCEDURE — 74011250637 HC RX REV CODE- 250/637: Performed by: ANESTHESIOLOGY

## 2020-09-28 PROCEDURE — 77030014008 HC SPNG HEMSTAT J&J -C: Performed by: ANESTHESIOLOGY

## 2020-09-28 PROCEDURE — 82962 GLUCOSE BLOOD TEST: CPT

## 2020-09-28 PROCEDURE — 77030031139 HC SUT VCRL2 J&J -A: Performed by: ANESTHESIOLOGY

## 2020-09-28 PROCEDURE — 2709999900 HC NON-CHARGEABLE SUPPLY: Performed by: ANESTHESIOLOGY

## 2020-09-28 PROCEDURE — 76210000006 HC OR PH I REC 0.5 TO 1 HR: Performed by: ANESTHESIOLOGY

## 2020-09-28 PROCEDURE — 76210000021 HC REC RM PH II 0.5 TO 1 HR: Performed by: ANESTHESIOLOGY

## 2020-09-28 RX ORDER — LIDOCAINE HYDROCHLORIDE 20 MG/ML
INJECTION, SOLUTION INFILTRATION; PERINEURAL AS NEEDED
Status: DISCONTINUED | OUTPATIENT
Start: 2020-09-28 | End: 2020-09-28 | Stop reason: HOSPADM

## 2020-09-28 RX ORDER — LIDOCAINE HYDROCHLORIDE 10 MG/ML
0.1 INJECTION INFILTRATION; PERINEURAL AS NEEDED
Status: DISCONTINUED | OUTPATIENT
Start: 2020-09-28 | End: 2020-09-28 | Stop reason: HOSPADM

## 2020-09-28 RX ORDER — PROPOFOL 10 MG/ML
INJECTION, EMULSION INTRAVENOUS
Status: DISCONTINUED | OUTPATIENT
Start: 2020-09-28 | End: 2020-09-28 | Stop reason: HOSPADM

## 2020-09-28 RX ORDER — OXYCODONE AND ACETAMINOPHEN 10; 325 MG/1; MG/1
1 TABLET ORAL
Qty: 28 TAB | Refills: 0 | Status: SHIPPED | OUTPATIENT
Start: 2020-09-28 | End: 2020-10-05

## 2020-09-28 RX ORDER — PROPOFOL 10 MG/ML
INJECTION, EMULSION INTRAVENOUS AS NEEDED
Status: DISCONTINUED | OUTPATIENT
Start: 2020-09-28 | End: 2020-09-28 | Stop reason: HOSPADM

## 2020-09-28 RX ORDER — ACETAMINOPHEN 325 MG/1
650 TABLET ORAL ONCE
Status: COMPLETED | OUTPATIENT
Start: 2020-09-28 | End: 2020-09-28

## 2020-09-28 RX ORDER — BUPIVACAINE HYDROCHLORIDE 5 MG/ML
INJECTION, SOLUTION EPIDURAL; INTRACAUDAL AS NEEDED
Status: DISCONTINUED | OUTPATIENT
Start: 2020-09-28 | End: 2020-09-28 | Stop reason: HOSPADM

## 2020-09-28 RX ORDER — NALOXONE HYDROCHLORIDE 0.4 MG/ML
0.1 INJECTION, SOLUTION INTRAMUSCULAR; INTRAVENOUS; SUBCUTANEOUS AS NEEDED
Status: DISCONTINUED | OUTPATIENT
Start: 2020-09-28 | End: 2020-09-28 | Stop reason: HOSPADM

## 2020-09-28 RX ORDER — HYDROMORPHONE HYDROCHLORIDE 2 MG/ML
0.5 INJECTION, SOLUTION INTRAMUSCULAR; INTRAVENOUS; SUBCUTANEOUS
Status: DISCONTINUED | OUTPATIENT
Start: 2020-09-28 | End: 2020-09-28 | Stop reason: HOSPADM

## 2020-09-28 RX ORDER — CLINDAMYCIN PHOSPHATE 900 MG/50ML
900 INJECTION INTRAVENOUS ONCE
Status: COMPLETED | OUTPATIENT
Start: 2020-09-28 | End: 2020-09-28

## 2020-09-28 RX ORDER — CEFAZOLIN SODIUM 1 G/3ML
INJECTION, POWDER, FOR SOLUTION INTRAMUSCULAR; INTRAVENOUS AS NEEDED
Status: DISCONTINUED | OUTPATIENT
Start: 2020-09-28 | End: 2020-09-28 | Stop reason: HOSPADM

## 2020-09-28 RX ORDER — SODIUM CHLORIDE, SODIUM LACTATE, POTASSIUM CHLORIDE, CALCIUM CHLORIDE 600; 310; 30; 20 MG/100ML; MG/100ML; MG/100ML; MG/100ML
100 INJECTION, SOLUTION INTRAVENOUS CONTINUOUS
Status: DISCONTINUED | OUTPATIENT
Start: 2020-09-28 | End: 2020-09-28 | Stop reason: HOSPADM

## 2020-09-28 RX ORDER — LIDOCAINE HYDROCHLORIDE 20 MG/ML
INJECTION, SOLUTION EPIDURAL; INFILTRATION; INTRACAUDAL; PERINEURAL AS NEEDED
Status: DISCONTINUED | OUTPATIENT
Start: 2020-09-28 | End: 2020-09-28 | Stop reason: HOSPADM

## 2020-09-28 RX ORDER — OXYCODONE HYDROCHLORIDE 5 MG/1
5 TABLET ORAL
Status: DISCONTINUED | OUTPATIENT
Start: 2020-09-28 | End: 2020-09-28 | Stop reason: HOSPADM

## 2020-09-28 RX ADMIN — LIDOCAINE HYDROCHLORIDE 60 MG: 20 INJECTION, SOLUTION EPIDURAL; INFILTRATION; INTRACAUDAL; PERINEURAL at 14:03

## 2020-09-28 RX ADMIN — PROPOFOL 30 MG: 10 INJECTION, EMULSION INTRAVENOUS at 14:03

## 2020-09-28 RX ADMIN — SODIUM CHLORIDE, SODIUM LACTATE, POTASSIUM CHLORIDE, AND CALCIUM CHLORIDE 100 ML/HR: 600; 310; 30; 20 INJECTION, SOLUTION INTRAVENOUS at 13:00

## 2020-09-28 RX ADMIN — PROPOFOL 100 MCG/KG/MIN: 10 INJECTION, EMULSION INTRAVENOUS at 14:03

## 2020-09-28 RX ADMIN — CLINDAMYCIN PHOSPHATE 900 MG: 900 INJECTION, SOLUTION INTRAVENOUS at 14:05

## 2020-09-28 RX ADMIN — ACETAMINOPHEN 650 MG: 325 TABLET, FILM COATED ORAL at 13:11

## 2020-09-28 NOTE — H&P
Mr. Amauri Dillard is a 65 yo male here today for SCS removal.  Mr. Amauri Dillard is in need of an MRI and is having site due to the SCS stimulator. O: Heart RRR, Lungs CTAB, HEENT Externally Normal    A/P: Will proceed to SCS explant both leads and generator.      Sanjay Gómez M.D.

## 2020-09-28 NOTE — ANESTHESIA POSTPROCEDURE EVALUATION
Procedure(s):  SPINAL CORD STIMULATOR REMOVAL. total IV anesthesia    Anesthesia Post Evaluation        Patient location during evaluation: PACU  Patient participation: complete - patient participated  Level of consciousness: responsive to verbal stimuli  Pain management: adequate  Airway patency: patent  Anesthetic complications: no  Cardiovascular status: acceptable  Respiratory status: acceptable  Hydration status: euvolemic        INITIAL Post-op Vital signs:   Vitals Value Taken Time   /61 9/28/2020  3:57 PM   Temp 36.7 °C (98 °F) 9/28/2020  3:08 PM   Pulse 66 9/28/2020  3:57 PM   Resp 14 9/28/2020  3:08 PM   SpO2 97 % 9/28/2020  3:57 PM   Vitals shown include unvalidated device data.

## 2020-09-28 NOTE — DISCHARGE INSTRUCTIONS
No driving until staples are removed. No NSAIDs/ASA for 24 hours. Leave dressing in place for 72 hours then you can shower normally (no tub baths/swimming until after post-op visit). Post-op visit in 11-14 days. DIET  · Clear liquids until no nausea or vomiting; then light diet for the first day. · Advance to regular diet on second day, unless your doctor orders otherwise. · If nausea and vomiting continues, call your doctor. PAIN  · Take pain medication as directed by your doctor. · Call your doctor if pain is NOT relieved by medication. CALL YOUR DOCTOR IF   · Excessive bleeding that does not stop after holding pressure over the area  · Temperature of 101 degrees F or above  · Excessive redness, swelling or bruising, and/ or green or yellow, smelly discharge from incision    AFTER ANESTHESIA   · For the first 24 hours: DO NOT Drive, Drink alcoholic beverages, or Make important decisions. · Be aware of dizziness following anesthesia and while taking pain medication. DISCHARGE SUMMARY from Nurse    PATIENT INSTRUCTIONS:    After general anesthesia or intravenous sedation, for 24 hours or while taking prescription Narcotics:  · Limit your activities  · Do not drive and operate hazardous machinery  · Do not make important personal or business decisions  · Do  not drink alcoholic beverages  · If you have not urinated within 8 hours after discharge, please contact your surgeon on call. *  Please give a list of your current medications to your Primary Care Provider. *  Please update this list whenever your medications are discontinued, doses are      changed, or new medications (including over-the-counter products) are added. *  Please carry medication information at all times in case of emergency situations.       These are general instructions for a healthy lifestyle:    No smoking/ No tobacco products/ Avoid exposure to second hand smoke    Surgeon General's Warning:  Quitting smoking now greatly reduces serious risk to your health. Obesity, smoking, and sedentary lifestyle greatly increases your risk for illness    A healthy diet, regular physical exercise & weight monitoring are important for maintaining a healthy lifestyle    You may be retaining fluid if you have a history of heart failure or if you experience any of the following symptoms:  Weight gain of 3 pounds or more overnight or 5 pounds in a week, increased swelling in our hands or feet or shortness of breath while lying flat in bed. Please call your doctor as soon as you notice any of these symptoms; do not wait until your next office visit. Recognize signs and symptoms of STROKE:    F-face looks uneven    A-arms unable to move or move unevenly    S-speech slurred or non-existent    T-time-call 911 as soon as signs and symptoms begin-DO NOT go       Back to bed or wait to see if you get better-TIME IS BRAIN. Advance Care Planning  People with COVID-19 may have no symptoms, mild symptoms, such as fever, cough, and shortness of breath or they may have more severe illness, developing severe and fatal pneumonia. As a result, Advance Care Planning with attention to naming a health care decision maker (someone you trust to make healthcare decisions for you if you could not speak for yourself) and sharing other health care preferences is important BEFORE a possible health crisis. Please contact your Primary Care Provider to discuss Advance Care Planning.      Preventing the Spread of Coronavirus Disease 2019 in Homes and Residential Communities  For the most recent information go to [employer]    Prevention steps for People with confirmed or suspected COVID-19 (including persons under investigation) who do not need to be hospitalized  and   People with confirmed COVID-19 who were hospitalized and determined to be medically stable to go home    Your healthcare provider and public health staff will evaluate whether you can be cared for at home. If it is determined that you do not need to be hospitalized and can be isolated at home, you will be monitored by staff from your local or state health department. You should follow the prevention steps below until a healthcare provider or local or state health department says you can return to your normal activities. Stay home except to get medical care  People who are mildly ill with COVID-19 are able to isolate at home during their illness. You should restrict activities outside your home, except for getting medical care. Do not go to work, school, or public areas. Avoid using public transportation, ride-sharing, or taxis. Separate yourself from other people and animals in your home  People: As much as possible, you should stay in a specific room and away from other people in your home. Also, you should use a separate bathroom, if available. Animals: You should restrict contact with pets and other animals while you are sick with COVID-19, just like you would around other people. Although there have not been reports of pets or other animals becoming sick with COVID-19, it is still recommended that people sick with COVID-19 limit contact with animals until more information is known about the virus. When possible, have another member of your household care for your animals while you are sick. If you are sick with COVID-19, avoid contact with your pet, including petting, snuggling, being kissed or licked, and sharing food. If you must care for your pet or be around animals while you are sick, wash your hands before and after you interact with pets and wear a facemask. Call ahead before visiting your doctor  If you have a medical appointment, call the healthcare provider and tell them that you have or may have COVID-19.  This will help the healthcare providers office take steps to keep other people from getting infected or exposed. Wear a facemask  You should wear a facemask when you are around other people (e.g., sharing a room or vehicle) or pets and before you enter a healthcare providers office. If you are not able to wear a facemask (for example, because it causes trouble breathing), then people who live with you should not stay in the same room with you, or they should wear a facemask if they enter your room. Cover your coughs and sneezes  Cover your mouth and nose with a tissue when you cough or sneeze. Throw used tissues in a lined trash can. Immediately wash your hands with soap and water for at least 20 seconds or, if soap and water are not available, clean your hands with an alcohol-based hand  that contains at least 60% alcohol. Clean your hands often  Wash your hands often with soap and water for at least 20 seconds, especially after blowing your nose, coughing, or sneezing; going to the bathroom; and before eating or preparing food. If soap and water are not readily available, use an alcohol-based hand  with at least 60% alcohol, covering all surfaces of your hands and rubbing them together until they feel dry. Soap and water are the best option if hands are visibly dirty. Avoid touching your eyes, nose, and mouth with unwashed hands. Avoid sharing personal household items  You should not share dishes, drinking glasses, cups, eating utensils, towels, or bedding with other people or pets in your home. After using these items, they should be washed thoroughly with soap and water. Clean all high-touch surfaces everyday  High touch surfaces include counters, tabletops, doorknobs, bathroom fixtures, toilets, phones, keyboards, tablets, and bedside tables. Also, clean any surfaces that may have blood, stool, or body fluids on them. Use a household cleaning spray or wipe, according to the label instructions.  Labels contain instructions for safe and effective use of the cleaning product including precautions you should take when applying the product, such as wearing gloves and making sure you have good ventilation during use of the product. Monitor your symptoms  Seek prompt medical attention if your illness is worsening (e.g., difficulty breathing). Before seeking care, call your healthcare provider and tell them that you have, or are being evaluated for, COVID-19. Put on a facemask before you enter the facility. These steps will help the healthcare providers office to keep other people in the office or waiting room from getting infected or exposed. Ask your healthcare provider to call the local or state health department. Persons who are placed under active monitoring or facilitated self-monitoring should follow instructions provided by their local health department or occupational health professionals, as appropriate. When working with your local health department check their available hours. If you have a medical emergency and need to call 911, notify the dispatch personnel that you have, or are being evaluated for COVID-19. If possible, put on a facemask before emergency medical services arrive. Discontinuing home isolation  Patients with confirmed COVID-19 should remain under home isolation precautions until the risk of secondary transmission to others is thought to be low. The decision to discontinue home isolation precautions should be made on a case-by-case basis, in consultation with healthcare providers and state and local health departments.

## 2020-09-28 NOTE — INTERVAL H&P NOTE
Update History & Physical 
 
The Patient's History and Physical of September 28, 2020 was reviewed with the patient and I examined the patient. There was no change. The surgical site was confirmed by the patient and me. Plan:  The risk, benefits, expected outcome, and alternative to the recommended procedure have been discussed with the patient. Patient understands and wants to proceed with the procedure.  
 
Electronically signed by Cristina Colon MD on 9/28/2020 at 1:48 PM

## 2020-09-28 NOTE — OP NOTES
[facility], [facility]    Location: [facility]    Dual Lead [place] Spinal Cord Stimulator Removal    Date: 9/28/2020      Indication: Lumbar Radiculopathy, Chronic Pain Syndrome     Allergies/Blood Thinners Reviewed: Yes    Operative Surgery Record    Preoperative diagnosis:  1. Lumbar Radiculopathy    2. Chronic pain syndrome    Postoperative diagnosis:  Same    Procedure:  1. Removal of pre-existing [place] spinal cord stimulator generator    2. Removal of pre-existing [place] spinal cord stimulator leads x 2    Surgeon: Melissa Nguyễn M.D. Assistant: None    Anesthesia: MAC    Estimated blood loss: 50 mL    Complications: None    Procedure:    After informed consent was obtained and the patient was identified in the holding area surgical area was marked with my initials in the left supragluteal area and in the midline for generator and lead removal per the patient's request. The procedure was then discussed with the patient. Possible risks and complications were discussed. Alternative therapies were once again discussed. The patient expressed understanding of all of these and expressed desire to proceed with the procedure. The patient was taken to the operating room. MAC anesthesia was then administered. The patient was turned in the prone position on a padded cushion with all pressure points checked and padded. For IV antibiotic prophylaxis 900 mg Clindamycin was administered. A 3 inch line was marked in the left supragluteal area for generator removal. The surgical area was then prepped with ChloraPrep and draped in the usual sterile fashion. Proper timeout procedure was then performed with all members of the surgical team in agreement with the patient identified and procedure to be performed. Surgical sites were injected with an equal mixture of 0.5% Bupivacine without Epinephrine and 2% Lidocaine for local anesthesia.  Incision was made along the marked lines for generator and lead removal. Using electrocautery and blunt dissection to open up the generator pocket subcutaneously to remove the pre-existing Jian generator. Midline incision was then opened and leads, anchors and all visible sutures were removed    The wounds were copiously irrigated with saline solution. Hemostasis was assured. All incisions were then closed with interrupted 2-0 and 3-0 Vicryl stitches and staples. Dressings were then applied. The patient tolerated the procedure well and was taken to the recovery room for recovery before discharge. Discharge instructions were given. Prescriptions for pain medication was given as patient already has prescription for antiobiotics. Patient to follow-up in 11-14 days.      Hipolito Byers M.D.

## (undated) DEVICE — BUTTON SWITCH PENCIL BLADE ELECTRODE, HOLSTER: Brand: EDGE

## (undated) DEVICE — SUTURE VCRL SZ 3-0 L27IN ABSRB UD L19MM PS-2 3/8 CIR PRIM J427H

## (undated) DEVICE — DRAPE XR C ARM 41X74IN LF --

## (undated) DEVICE — SYR 10ML LUER LOK 1/5ML GRAD --

## (undated) DEVICE — DRESSING,GAUZE,XEROFORM,CURAD,1"X8",ST: Brand: CURAD

## (undated) DEVICE — Device: Brand: PORTEX

## (undated) DEVICE — PREP SKN CHLRAPRP APL 26ML STR --

## (undated) DEVICE — PLASTIC ADHESIVE BANDAGE: Brand: CURITY

## (undated) DEVICE — Z DISCONTINUED USE 2219801 STAPLER SKIN REG CRWN L5.7MM LEG L3.9MM WIRE DIA0.53MM PROX

## (undated) DEVICE — TRAY EPIDURAL OB/OR SC ONLY --

## (undated) DEVICE — SUTURE VCRL SZ 2-0 L27IN ABSRB UD L26MM SH 1/2 CIR J417H

## (undated) DEVICE — OCCLUSIVE GAUZE STRIP,3% BISMUTH TRIBROMOPHENATE IN PETROLATUM BLEND: Brand: XEROFORM

## (undated) DEVICE — CANNULA NSL ORAL AD FOR CAPNOFLEX CO2 O2 AIRLFE

## (undated) DEVICE — 2000CC GUARDIAN II: Brand: GUARDIAN

## (undated) DEVICE — KENDALL RADIOLUCENT FOAM MONITORING ELECTRODE RECTANGULAR SHAPE: Brand: KENDALL

## (undated) DEVICE — Device

## (undated) DEVICE — BLOCK BITE AD 60FR W/ VELC STRP ADDRESSES MOST PT AND

## (undated) DEVICE — FORCEPS BX L240CM JAW DIA2.8MM L CAP W/ NDL MIC MESH TOOTH

## (undated) DEVICE — CABLE OR W/EXT TRAY 2X8 60CM -- SC-4108 PRECISION SPECTRA STRL

## (undated) DEVICE — ELECTRODE ES AD DISPER HYDRGEL THN FOAM ADH SCALLOPED EDGE

## (undated) DEVICE — 3M™ TEGADERM™ TRANSPARENT FILM DRESSING FRAME STYLE, 1626W, 4 IN X 4-3/4 IN (10 CM X 12 CM), 50/CT 4CT/CASE: Brand: 3M™ TEGADERM™

## (undated) DEVICE — SUTURE VCRL SZ 3-0 L27IN ABSRB UD L26MM SH 1/2 CIR J416H

## (undated) DEVICE — SHEET, T, LAPAROTOMY, STERILE: Brand: MEDLINE

## (undated) DEVICE — SYR 3ML LL TIP 1/10ML GRAD --

## (undated) DEVICE — ESOPHAGEAL BALLOON DILATATION CATHETER: Brand: CRE FIXED WIRE

## (undated) DEVICE — COOLIEF* SINERGY* COOLED RADIOFREQUENCY PROBE KIT: Brand: AVANOS

## (undated) DEVICE — DRAPE SHT 3 QTR PROXIMA 53X77 --

## (undated) DEVICE — SURGICAL PROCEDURE PACK BASIC ST FRANCIS

## (undated) DEVICE — TUNNELER NEUROSTIMULATOR L28CM FOR SPNL CRD STIM SYS

## (undated) DEVICE — 1200 GUARD II KIT W/5MM TUBE W/O VAC TUBE: Brand: GUARDIAN

## (undated) DEVICE — CONNECTOR TBNG OD5-7MM O2 END DISP

## (undated) DEVICE — SOLUTION IV 1000ML 0.9% SOD CHL

## (undated) DEVICE — SUT ETHBND 0 30IN SH GRN --

## (undated) DEVICE — AGENT HEMSTAT W2XL14IN OXIDIZED REGENERATED CELOS ABSRB FOR

## (undated) DEVICE — Z DISCONTINUED USE 2423037 APPLICATOR MEDICATED 3 CC CHLORHEXIDINE GLUC 2% CHLORAPREP

## (undated) DEVICE — DRAPE TWL SURG 16X26IN BLU ORB04] ALLCARE INC]

## (undated) DEVICE — MEDI-VAC YANKAUER SUCTION HANDLE W/BULBOUS TIP: Brand: CARDINAL HEALTH

## (undated) DEVICE — 3M™ IOBAN™ 2 ANTIMICROBIAL INCISE DRAPE 6650EZ: Brand: IOBAN™ 2

## (undated) DEVICE — STANDARD HYPODERMIC NEEDLE,POLYPROPYLENE HUB: Brand: MONOJECT

## (undated) DEVICE — REM POLYHESIVE ADULT PATIENT RETURN ELECTRODE: Brand: VALLEYLAB

## (undated) DEVICE — SUTURE VCRL SZ 2-0 L27IN ABSRB UD L36MM CP-1 1/2 CIR REV J266H

## (undated) DEVICE — NDL SPNE QNCKE 25GX3.5IN LF --

## (undated) DEVICE — SYR 5ML 1/5 GRAD LL NSAF LF --

## (undated) DEVICE — CONTAINER PREFIL FRMLN 40ML --

## (undated) DEVICE — NDL PRT INJ NSAF BLNT 18GX1.5 --